# Patient Record
Sex: MALE | Race: WHITE | NOT HISPANIC OR LATINO | Employment: OTHER | URBAN - METROPOLITAN AREA
[De-identification: names, ages, dates, MRNs, and addresses within clinical notes are randomized per-mention and may not be internally consistent; named-entity substitution may affect disease eponyms.]

---

## 2017-06-30 ENCOUNTER — HISTORICAL (OUTPATIENT)
Dept: RADIOLOGY | Facility: HOSPITAL | Age: 67
End: 2017-06-30

## 2017-08-01 ENCOUNTER — HISTORICAL (OUTPATIENT)
Dept: RADIOLOGY | Facility: HOSPITAL | Age: 67
End: 2017-08-01

## 2017-09-01 ENCOUNTER — HISTORICAL (OUTPATIENT)
Dept: RADIOLOGY | Facility: HOSPITAL | Age: 67
End: 2017-09-01

## 2017-09-13 ENCOUNTER — HISTORICAL (OUTPATIENT)
Dept: RADIOLOGY | Facility: HOSPITAL | Age: 67
End: 2017-09-13

## 2018-08-28 ENCOUNTER — HISTORICAL (OUTPATIENT)
Dept: ADMINISTRATIVE | Facility: HOSPITAL | Age: 68
End: 2018-08-28

## 2018-08-28 LAB
HCO3 UR-SCNC: 20.1 MMOL/L (ref 22–26)
PCO2 BLDA: 34.2 MMHG (ref 35–45)
PH SMN: 7.38 [PH] (ref 7.35–7.45)
PO2 BLDA: 49 MMHG (ref 80–100)
PO2 BLDA: 49 MMHG (ref 80–100)
POC ALLENS TEST: POSITIVE
POC BE: -5 MMOL/L (ref -2–3)
POC SAMPLESOURCE: ABNORMAL
POC SATURATED O2: 84 % (ref 96–97)
POC SATURATED O2: 84 MMHG (ref 96–97)
POC SITE: ABNORMAL
POC TCO2: 21 MMOL/L (ref 24–29)
POC TREATMENT: ABNORMAL

## 2020-12-21 ENCOUNTER — HISTORICAL (OUTPATIENT)
Dept: INFECTIOUS DISEASES | Facility: HOSPITAL | Age: 70
End: 2020-12-21

## 2021-03-29 DIAGNOSIS — Q21.10 ASD (ATRIAL SEPTAL DEFECT): Primary | ICD-10-CM

## 2021-03-30 ENCOUNTER — OFFICE VISIT (OUTPATIENT)
Dept: CARDIOLOGY | Facility: CLINIC | Age: 71
End: 2021-03-30
Payer: MEDICARE

## 2021-03-30 ENCOUNTER — TELEPHONE (OUTPATIENT)
Dept: CARDIOLOGY | Facility: CLINIC | Age: 71
End: 2021-03-30

## 2021-03-30 VITALS
DIASTOLIC BLOOD PRESSURE: 86 MMHG | HEART RATE: 53 BPM | HEIGHT: 66 IN | WEIGHT: 134.06 LBS | OXYGEN SATURATION: 89 % | SYSTOLIC BLOOD PRESSURE: 164 MMHG | BODY MASS INDEX: 21.55 KG/M2

## 2021-03-30 DIAGNOSIS — D15.1 BENIGN NEOPLASM OF HEART: ICD-10-CM

## 2021-03-30 DIAGNOSIS — E78.5 HYPERLIPIDEMIA, UNSPECIFIED HYPERLIPIDEMIA TYPE: ICD-10-CM

## 2021-03-30 DIAGNOSIS — Q21.10 ASD (ATRIAL SEPTAL DEFECT): ICD-10-CM

## 2021-03-30 DIAGNOSIS — Z86.79 HISTORY OF PULMONARY VALVE STENOSIS: ICD-10-CM

## 2021-03-30 DIAGNOSIS — I10 HYPERTENSION, UNSPECIFIED TYPE: ICD-10-CM

## 2021-03-30 DIAGNOSIS — Q21.10 ASD (ATRIAL SEPTAL DEFECT): Primary | ICD-10-CM

## 2021-03-30 DIAGNOSIS — I37.1 NONRHEUMATIC PULMONARY VALVE INSUFFICIENCY: ICD-10-CM

## 2021-03-30 DIAGNOSIS — Q24.9 CONGENITAL MALFORMATION OF HEART, UNSPECIFIED: ICD-10-CM

## 2021-03-30 PROCEDURE — 99205 PR OFFICE/OUTPT VISIT, NEW, LEVL V, 60-74 MIN: ICD-10-PCS | Mod: S$PBB,,, | Performed by: INTERNAL MEDICINE

## 2021-03-30 PROCEDURE — 99999 PR PBB SHADOW E&M-EST. PATIENT-LVL III: CPT | Mod: PBBFAC,,, | Performed by: INTERNAL MEDICINE

## 2021-03-30 PROCEDURE — 99205 OFFICE O/P NEW HI 60 MIN: CPT | Mod: S$PBB,,, | Performed by: INTERNAL MEDICINE

## 2021-03-30 PROCEDURE — 99999 PR PBB SHADOW E&M-EST. PATIENT-LVL III: ICD-10-PCS | Mod: PBBFAC,,, | Performed by: INTERNAL MEDICINE

## 2021-03-30 PROCEDURE — 99213 OFFICE O/P EST LOW 20 MIN: CPT | Mod: PBBFAC | Performed by: INTERNAL MEDICINE

## 2021-03-30 RX ORDER — ASPIRIN 81 MG/1
81 TABLET ORAL DAILY
COMMUNITY

## 2021-03-30 RX ORDER — TRAZODONE HYDROCHLORIDE 50 MG/1
50 TABLET ORAL NIGHTLY
COMMUNITY

## 2021-03-30 RX ORDER — CARVEDILOL 3.12 MG/1
3.12 TABLET ORAL 2 TIMES DAILY WITH MEALS
COMMUNITY
End: 2021-05-26

## 2021-03-30 RX ORDER — ZOLPIDEM TARTRATE 10 MG/1
5 TABLET ORAL NIGHTLY PRN
COMMUNITY

## 2021-03-30 RX ORDER — OMEPRAZOLE 20 MG/1
20 CAPSULE, DELAYED RELEASE ORAL DAILY
COMMUNITY

## 2021-03-30 RX ORDER — PRAVASTATIN SODIUM 40 MG/1
40 TABLET ORAL DAILY
Status: ON HOLD | COMMUNITY
End: 2021-05-27 | Stop reason: HOSPADM

## 2021-03-30 RX ORDER — DIAZEPAM 10 MG/1
10 TABLET ORAL
COMMUNITY

## 2021-03-30 RX ORDER — LEVOTHYROXINE SODIUM 100 UG/1
150 TABLET ORAL
COMMUNITY

## 2021-03-30 RX ORDER — DIPHENHYDRAMINE HCL 50 MG
50 CAPSULE ORAL ONCE
Status: CANCELLED | OUTPATIENT
Start: 2021-03-30 | End: 2021-03-30

## 2021-03-30 RX ORDER — CETIRIZINE HYDROCHLORIDE 10 MG/1
10 TABLET ORAL DAILY
COMMUNITY

## 2021-03-30 RX ORDER — FLUTICASONE PROPIONATE 50 MCG
1 SPRAY, SUSPENSION (ML) NASAL
COMMUNITY

## 2021-03-30 RX ORDER — SODIUM CHLORIDE 9 MG/ML
INJECTION, SOLUTION INTRAVENOUS CONTINUOUS
Status: CANCELLED | OUTPATIENT
Start: 2021-03-30 | End: 2021-03-30

## 2021-05-26 ENCOUNTER — HOSPITAL ENCOUNTER (INPATIENT)
Facility: HOSPITAL | Age: 71
LOS: 1 days | Discharge: HOME | DRG: 065 | End: 2021-05-27
Attending: EMERGENCY MEDICINE | Admitting: PSYCHIATRY & NEUROLOGY
Payer: MEDICARE

## 2021-05-26 ENCOUNTER — TELEPHONE (OUTPATIENT)
Dept: CARDIOLOGY | Facility: CLINIC | Age: 71
End: 2021-05-26

## 2021-05-26 DIAGNOSIS — I63.9 CEREBROVASCULAR ACCIDENT (CVA), UNSPECIFIED MECHANISM: ICD-10-CM

## 2021-05-26 DIAGNOSIS — G93.6 CYTOTOXIC CEREBRAL EDEMA: ICD-10-CM

## 2021-05-26 DIAGNOSIS — I10 HYPERTENSION, UNSPECIFIED TYPE: ICD-10-CM

## 2021-05-26 DIAGNOSIS — I63.412 EMBOLIC STROKE INVOLVING LEFT MIDDLE CEREBRAL ARTERY: ICD-10-CM

## 2021-05-26 DIAGNOSIS — Q21.10 ASD (ATRIAL SEPTAL DEFECT): ICD-10-CM

## 2021-05-26 DIAGNOSIS — I65.23 BILATERAL CAROTID ARTERY STENOSIS: ICD-10-CM

## 2021-05-26 DIAGNOSIS — I63.9 STROKE: ICD-10-CM

## 2021-05-26 PROBLEM — I63.233 CEREBROVASCULAR ACCIDENT (CVA) DUE TO BILATERAL STENOSIS OF CAROTID ARTERIES: Status: ACTIVE | Noted: 2021-05-26

## 2021-05-26 LAB
ALBUMIN SERPL BCP-MCNC: 4.1 G/DL (ref 3.5–5.2)
ALP SERPL-CCNC: 121 U/L (ref 55–135)
ALT SERPL W/O P-5'-P-CCNC: 17 U/L (ref 10–44)
ANION GAP SERPL CALC-SCNC: 11 MMOL/L (ref 8–16)
AST SERPL-CCNC: 28 U/L (ref 10–40)
BASOPHILS # BLD AUTO: 0.05 K/UL (ref 0–0.2)
BASOPHILS NFR BLD: 0.6 % (ref 0–1.9)
BILIRUB SERPL-MCNC: 0.6 MG/DL (ref 0.1–1)
BUN SERPL-MCNC: 9 MG/DL (ref 8–23)
CALCIUM SERPL-MCNC: 9.3 MG/DL (ref 8.7–10.5)
CHLORIDE SERPL-SCNC: 103 MMOL/L (ref 95–110)
CHOLEST SERPL-MCNC: 221 MG/DL (ref 120–199)
CHOLEST/HDLC SERPL: 2.9 {RATIO} (ref 2–5)
CO2 SERPL-SCNC: 21 MMOL/L (ref 23–29)
CREAT SERPL-MCNC: 1.1 MG/DL (ref 0.5–1.4)
CTP QC/QA: YES
DIFFERENTIAL METHOD: ABNORMAL
EOSINOPHIL # BLD AUTO: 0.1 K/UL (ref 0–0.5)
EOSINOPHIL NFR BLD: 1.3 % (ref 0–8)
ERYTHROCYTE [DISTWIDTH] IN BLOOD BY AUTOMATED COUNT: 13.6 % (ref 11.5–14.5)
EST. GFR  (AFRICAN AMERICAN): >60 ML/MIN/1.73 M^2
EST. GFR  (NON AFRICAN AMERICAN): >60 ML/MIN/1.73 M^2
ESTIMATED AVG GLUCOSE: 131 MG/DL (ref 68–131)
GLUCOSE SERPL-MCNC: 92 MG/DL (ref 70–110)
HBA1C MFR BLD: 6.2 % (ref 4–5.6)
HCT VFR BLD AUTO: 55.4 % (ref 40–54)
HDLC SERPL-MCNC: 77 MG/DL (ref 40–75)
HDLC SERPL: 34.8 % (ref 20–50)
HGB BLD-MCNC: 18.2 G/DL (ref 14–18)
IMM GRANULOCYTES # BLD AUTO: 0.03 K/UL (ref 0–0.04)
IMM GRANULOCYTES NFR BLD AUTO: 0.3 % (ref 0–0.5)
LDLC SERPL CALC-MCNC: 125.8 MG/DL (ref 63–159)
LYMPHOCYTES # BLD AUTO: 1.6 K/UL (ref 1–4.8)
LYMPHOCYTES NFR BLD: 18.2 % (ref 18–48)
MCH RBC QN AUTO: 31.4 PG (ref 27–31)
MCHC RBC AUTO-ENTMCNC: 32.9 G/DL (ref 32–36)
MCV RBC AUTO: 96 FL (ref 82–98)
MONOCYTES # BLD AUTO: 0.7 K/UL (ref 0.3–1)
MONOCYTES NFR BLD: 7.8 % (ref 4–15)
NEUTROPHILS # BLD AUTO: 6.4 K/UL (ref 1.8–7.7)
NEUTROPHILS NFR BLD: 71.8 % (ref 38–73)
NONHDLC SERPL-MCNC: 144 MG/DL
NRBC BLD-RTO: 0 /100 WBC
PLATELET # BLD AUTO: 146 K/UL (ref 150–450)
PMV BLD AUTO: 12.6 FL (ref 9.2–12.9)
POTASSIUM SERPL-SCNC: 5.6 MMOL/L (ref 3.5–5.1)
PROT SERPL-MCNC: 7.9 G/DL (ref 6–8.4)
RBC # BLD AUTO: 5.79 M/UL (ref 4.6–6.2)
SARS-COV-2 RDRP RESP QL NAA+PROBE: NEGATIVE
SODIUM SERPL-SCNC: 135 MMOL/L (ref 136–145)
TRIGL SERPL-MCNC: 91 MG/DL (ref 30–150)
TSH SERPL DL<=0.005 MIU/L-ACNC: 1.09 UIU/ML (ref 0.4–4)
WBC # BLD AUTO: 8.97 K/UL (ref 3.9–12.7)

## 2021-05-26 PROCEDURE — 80053 COMPREHEN METABOLIC PANEL: CPT | Performed by: PHYSICIAN ASSISTANT

## 2021-05-26 PROCEDURE — 80061 LIPID PANEL: CPT | Performed by: PHYSICIAN ASSISTANT

## 2021-05-26 PROCEDURE — 93010 EKG 12-LEAD: ICD-10-PCS | Mod: ,,, | Performed by: INTERNAL MEDICINE

## 2021-05-26 PROCEDURE — 11000001 HC ACUTE MED/SURG PRIVATE ROOM

## 2021-05-26 PROCEDURE — 99285 EMERGENCY DEPT VISIT HI MDM: CPT | Mod: CS,,, | Performed by: EMERGENCY MEDICINE

## 2021-05-26 PROCEDURE — 25000003 PHARM REV CODE 250: Performed by: PHYSICIAN ASSISTANT

## 2021-05-26 PROCEDURE — U0002 COVID-19 LAB TEST NON-CDC: HCPCS | Performed by: PHYSICIAN ASSISTANT

## 2021-05-26 PROCEDURE — 99223 PR INITIAL HOSPITAL CARE,LEVL III: ICD-10-PCS | Mod: AI,,, | Performed by: PSYCHIATRY & NEUROLOGY

## 2021-05-26 PROCEDURE — 99285 PR EMERGENCY DEPT VISIT,LEVEL V: ICD-10-PCS | Mod: CS,,, | Performed by: EMERGENCY MEDICINE

## 2021-05-26 PROCEDURE — 84443 ASSAY THYROID STIM HORMONE: CPT | Performed by: PHYSICIAN ASSISTANT

## 2021-05-26 PROCEDURE — 25500020 PHARM REV CODE 255: Performed by: EMERGENCY MEDICINE

## 2021-05-26 PROCEDURE — 83036 HEMOGLOBIN GLYCOSYLATED A1C: CPT | Performed by: PHYSICIAN ASSISTANT

## 2021-05-26 PROCEDURE — 93005 ELECTROCARDIOGRAM TRACING: CPT

## 2021-05-26 PROCEDURE — 85025 COMPLETE CBC W/AUTO DIFF WBC: CPT | Performed by: PHYSICIAN ASSISTANT

## 2021-05-26 PROCEDURE — 93010 ELECTROCARDIOGRAM REPORT: CPT | Mod: ,,, | Performed by: INTERNAL MEDICINE

## 2021-05-26 PROCEDURE — 99223 1ST HOSP IP/OBS HIGH 75: CPT | Mod: AI,,, | Performed by: PSYCHIATRY & NEUROLOGY

## 2021-05-26 PROCEDURE — 99285 EMERGENCY DEPT VISIT HI MDM: CPT | Mod: 25

## 2021-05-26 PROCEDURE — 63600175 PHARM REV CODE 636 W HCPCS: Performed by: PHYSICIAN ASSISTANT

## 2021-05-26 RX ORDER — FLUTICASONE PROPIONATE 50 MCG
1 SPRAY, SUSPENSION (ML) NASAL
Status: DISCONTINUED | OUTPATIENT
Start: 2021-05-26 | End: 2021-05-27 | Stop reason: HOSPADM

## 2021-05-26 RX ORDER — PHENOBARBITAL 100 MG/1
100 TABLET ORAL NIGHTLY
Status: DISCONTINUED | OUTPATIENT
Start: 2021-05-26 | End: 2021-05-27 | Stop reason: HOSPADM

## 2021-05-26 RX ORDER — SODIUM CHLORIDE 0.9 % (FLUSH) 0.9 %
10 SYRINGE (ML) INJECTION
Status: DISCONTINUED | OUTPATIENT
Start: 2021-05-26 | End: 2021-05-27 | Stop reason: HOSPADM

## 2021-05-26 RX ORDER — PRAVASTATIN SODIUM 10 MG/1
40 TABLET ORAL DAILY
Status: DISCONTINUED | OUTPATIENT
Start: 2021-05-27 | End: 2021-05-26

## 2021-05-26 RX ORDER — SUCRALFATE 1 G/10ML
1 SUSPENSION ORAL EVERY 6 HOURS
Status: DISCONTINUED | OUTPATIENT
Start: 2021-05-27 | End: 2021-05-27 | Stop reason: HOSPADM

## 2021-05-26 RX ORDER — DIAZEPAM 5 MG/1
10 TABLET ORAL DAILY PRN
Status: DISCONTINUED | OUTPATIENT
Start: 2021-05-26 | End: 2021-05-27 | Stop reason: HOSPADM

## 2021-05-26 RX ORDER — ACETAMINOPHEN 325 MG/1
650 TABLET ORAL EVERY 6 HOURS PRN
Status: DISCONTINUED | OUTPATIENT
Start: 2021-05-26 | End: 2021-05-27 | Stop reason: HOSPADM

## 2021-05-26 RX ORDER — TALC
6 POWDER (GRAM) TOPICAL NIGHTLY PRN
Status: DISCONTINUED | OUTPATIENT
Start: 2021-05-26 | End: 2021-05-27 | Stop reason: HOSPADM

## 2021-05-26 RX ORDER — ONDANSETRON 2 MG/ML
4 INJECTION INTRAMUSCULAR; INTRAVENOUS EVERY 12 HOURS PRN
Status: DISCONTINUED | OUTPATIENT
Start: 2021-05-26 | End: 2021-05-27 | Stop reason: HOSPADM

## 2021-05-26 RX ORDER — PANTOPRAZOLE SODIUM 40 MG/1
40 TABLET, DELAYED RELEASE ORAL DAILY
Status: DISCONTINUED | OUTPATIENT
Start: 2021-05-27 | End: 2021-05-27 | Stop reason: HOSPADM

## 2021-05-26 RX ORDER — ATORVASTATIN CALCIUM 20 MG/1
40 TABLET, FILM COATED ORAL DAILY
Status: DISCONTINUED | OUTPATIENT
Start: 2021-05-27 | End: 2021-05-27 | Stop reason: HOSPADM

## 2021-05-26 RX ORDER — POLYETHYLENE GLYCOL 3350 17 G/17G
17 POWDER, FOR SOLUTION ORAL DAILY PRN
Status: DISCONTINUED | OUTPATIENT
Start: 2021-05-26 | End: 2021-05-27 | Stop reason: HOSPADM

## 2021-05-26 RX ORDER — CLOPIDOGREL BISULFATE 75 MG/1
75 TABLET ORAL DAILY
Status: DISCONTINUED | OUTPATIENT
Start: 2021-05-27 | End: 2021-05-27 | Stop reason: HOSPADM

## 2021-05-26 RX ORDER — CETIRIZINE HYDROCHLORIDE 5 MG/1
10 TABLET ORAL DAILY
Status: DISCONTINUED | OUTPATIENT
Start: 2021-05-27 | End: 2021-05-27 | Stop reason: HOSPADM

## 2021-05-26 RX ORDER — ASPIRIN 81 MG/1
81 TABLET ORAL DAILY
Status: DISCONTINUED | OUTPATIENT
Start: 2021-05-27 | End: 2021-05-27 | Stop reason: HOSPADM

## 2021-05-26 RX ORDER — MAG HYDROX/ALUMINUM HYD/SIMETH 200-200-20
30 SUSPENSION, ORAL (FINAL DOSE FORM) ORAL
Status: DISCONTINUED | OUTPATIENT
Start: 2021-05-26 | End: 2021-05-27 | Stop reason: HOSPADM

## 2021-05-26 RX ORDER — LABETALOL HCL 20 MG/4 ML
10 SYRINGE (ML) INTRAVENOUS
Status: DISCONTINUED | OUTPATIENT
Start: 2021-05-26 | End: 2021-05-27 | Stop reason: HOSPADM

## 2021-05-26 RX ORDER — HEPARIN SODIUM 5000 [USP'U]/ML
5000 INJECTION, SOLUTION INTRAVENOUS; SUBCUTANEOUS EVERY 8 HOURS
Status: DISCONTINUED | OUTPATIENT
Start: 2021-05-26 | End: 2021-05-27 | Stop reason: HOSPADM

## 2021-05-26 RX ADMIN — PHENOBARBITAL 100 MG: 100 TABLET ORAL at 10:05

## 2021-05-26 RX ADMIN — IOHEXOL 100 ML: 350 INJECTION, SOLUTION INTRAVENOUS at 04:05

## 2021-05-26 RX ADMIN — HEPARIN SODIUM 5000 UNITS: 5000 INJECTION INTRAVENOUS; SUBCUTANEOUS at 10:05

## 2021-05-26 RX ADMIN — MELATONIN TAB 3 MG 6 MG: 3 TAB at 11:05

## 2021-05-27 VITALS
OXYGEN SATURATION: 100 % | WEIGHT: 132.94 LBS | DIASTOLIC BLOOD PRESSURE: 74 MMHG | SYSTOLIC BLOOD PRESSURE: 124 MMHG | HEIGHT: 66 IN | BODY MASS INDEX: 21.36 KG/M2 | TEMPERATURE: 97 F | RESPIRATION RATE: 18 BRPM | HEART RATE: 77 BPM

## 2021-05-27 LAB
ALBUMIN SERPL BCP-MCNC: 3.5 G/DL (ref 3.5–5.2)
ALP SERPL-CCNC: 111 U/L (ref 55–135)
ALT SERPL W/O P-5'-P-CCNC: 13 U/L (ref 10–44)
ANION GAP SERPL CALC-SCNC: 13 MMOL/L (ref 8–16)
APTT BLDCRRT: 30.6 SEC (ref 21–32)
AST SERPL-CCNC: 16 U/L (ref 10–40)
BASOPHILS # BLD AUTO: 0.06 K/UL (ref 0–0.2)
BASOPHILS NFR BLD: 0.8 % (ref 0–1.9)
BILIRUB SERPL-MCNC: 0.5 MG/DL (ref 0.1–1)
BUN SERPL-MCNC: 13 MG/DL (ref 8–23)
CALCIUM SERPL-MCNC: 8.9 MG/DL (ref 8.7–10.5)
CHLORIDE SERPL-SCNC: 107 MMOL/L (ref 95–110)
CK MB SERPL-MCNC: 1.3 NG/ML (ref 0.1–6.5)
CK MB SERPL-RTO: 2.1 % (ref 0–5)
CK SERPL-CCNC: 63 U/L (ref 20–200)
CO2 SERPL-SCNC: 20 MMOL/L (ref 23–29)
CREAT SERPL-MCNC: 0.9 MG/DL (ref 0.5–1.4)
DIFFERENTIAL METHOD: ABNORMAL
EOSINOPHIL # BLD AUTO: 0.1 K/UL (ref 0–0.5)
EOSINOPHIL NFR BLD: 1.7 % (ref 0–8)
ERYTHROCYTE [DISTWIDTH] IN BLOOD BY AUTOMATED COUNT: 13.4 % (ref 11.5–14.5)
EST. GFR  (AFRICAN AMERICAN): >60 ML/MIN/1.73 M^2
EST. GFR  (NON AFRICAN AMERICAN): >60 ML/MIN/1.73 M^2
GLUCOSE SERPL-MCNC: 79 MG/DL (ref 70–110)
HCT VFR BLD AUTO: 51.2 % (ref 40–54)
HGB BLD-MCNC: 16.7 G/DL (ref 14–18)
IMM GRANULOCYTES # BLD AUTO: 0.01 K/UL (ref 0–0.04)
IMM GRANULOCYTES NFR BLD AUTO: 0.1 % (ref 0–0.5)
INR PPP: 0.8 (ref 0.8–1.2)
LYMPHOCYTES # BLD AUTO: 1.9 K/UL (ref 1–4.8)
LYMPHOCYTES NFR BLD: 27.1 % (ref 18–48)
MAGNESIUM SERPL-MCNC: 1.8 MG/DL (ref 1.6–2.6)
MCH RBC QN AUTO: 31.1 PG (ref 27–31)
MCHC RBC AUTO-ENTMCNC: 32.6 G/DL (ref 32–36)
MCV RBC AUTO: 95 FL (ref 82–98)
MONOCYTES # BLD AUTO: 0.6 K/UL (ref 0.3–1)
MONOCYTES NFR BLD: 8.9 % (ref 4–15)
NEUTROPHILS # BLD AUTO: 4.4 K/UL (ref 1.8–7.7)
NEUTROPHILS NFR BLD: 61.4 % (ref 38–73)
NRBC BLD-RTO: 0 /100 WBC
PHOSPHATE SERPL-MCNC: 3.6 MG/DL (ref 2.7–4.5)
PLATELET # BLD AUTO: 145 K/UL (ref 150–450)
PMV BLD AUTO: 13 FL (ref 9.2–12.9)
POTASSIUM SERPL-SCNC: 4 MMOL/L (ref 3.5–5.1)
PROT SERPL-MCNC: 6.4 G/DL (ref 6–8.4)
PROTHROMBIN TIME: <9 SEC (ref 9–12.5)
RBC # BLD AUTO: 5.37 M/UL (ref 4.6–6.2)
SODIUM SERPL-SCNC: 140 MMOL/L (ref 136–145)
TROPONIN I SERPL DL<=0.01 NG/ML-MCNC: <0.006 NG/ML (ref 0–0.03)
WBC # BLD AUTO: 7.17 K/UL (ref 3.9–12.7)

## 2021-05-27 PROCEDURE — 92610 EVALUATE SWALLOWING FUNCTION: CPT

## 2021-05-27 PROCEDURE — 63600175 PHARM REV CODE 636 W HCPCS: Performed by: PHYSICIAN ASSISTANT

## 2021-05-27 PROCEDURE — 93880 EXTRACRANIAL BILAT STUDY: CPT | Performed by: SURGERY

## 2021-05-27 PROCEDURE — 85025 COMPLETE CBC W/AUTO DIFF WBC: CPT | Performed by: PHYSICIAN ASSISTANT

## 2021-05-27 PROCEDURE — 99222 PR INITIAL HOSPITAL CARE,LEVL II: ICD-10-PCS | Mod: ,,, | Performed by: NURSE PRACTITIONER

## 2021-05-27 PROCEDURE — 36415 COLL VENOUS BLD VENIPUNCTURE: CPT | Performed by: PHYSICIAN ASSISTANT

## 2021-05-27 PROCEDURE — 84100 ASSAY OF PHOSPHORUS: CPT | Performed by: PHYSICIAN ASSISTANT

## 2021-05-27 PROCEDURE — 85610 PROTHROMBIN TIME: CPT | Performed by: PHYSICIAN ASSISTANT

## 2021-05-27 PROCEDURE — 99222 1ST HOSP IP/OBS MODERATE 55: CPT | Mod: ,,, | Performed by: NURSE PRACTITIONER

## 2021-05-27 PROCEDURE — 99238 HOSP IP/OBS DSCHRG MGMT 30/<: CPT | Mod: GC,,, | Performed by: PSYCHIATRY & NEUROLOGY

## 2021-05-27 PROCEDURE — 97161 PT EVAL LOW COMPLEX 20 MIN: CPT

## 2021-05-27 PROCEDURE — 92523 SPEECH SOUND LANG COMPREHEN: CPT

## 2021-05-27 PROCEDURE — 99238 PR HOSPITAL DISCHARGE DAY,<30 MIN: ICD-10-PCS | Mod: GC,,, | Performed by: PSYCHIATRY & NEUROLOGY

## 2021-05-27 PROCEDURE — 84484 ASSAY OF TROPONIN QUANT: CPT | Performed by: PHYSICIAN ASSISTANT

## 2021-05-27 PROCEDURE — 80053 COMPREHEN METABOLIC PANEL: CPT | Performed by: PHYSICIAN ASSISTANT

## 2021-05-27 PROCEDURE — 25000003 PHARM REV CODE 250: Performed by: PHYSICIAN ASSISTANT

## 2021-05-27 PROCEDURE — 82550 ASSAY OF CK (CPK): CPT | Performed by: PHYSICIAN ASSISTANT

## 2021-05-27 PROCEDURE — 97165 OT EVAL LOW COMPLEX 30 MIN: CPT

## 2021-05-27 PROCEDURE — 83735 ASSAY OF MAGNESIUM: CPT | Performed by: PHYSICIAN ASSISTANT

## 2021-05-27 PROCEDURE — 85730 THROMBOPLASTIN TIME PARTIAL: CPT | Performed by: PHYSICIAN ASSISTANT

## 2021-05-27 PROCEDURE — 97535 SELF CARE MNGMENT TRAINING: CPT

## 2021-05-27 RX ORDER — ATORVASTATIN CALCIUM 40 MG/1
40 TABLET, FILM COATED ORAL DAILY
Qty: 30 TABLET | Refills: 0 | Status: SHIPPED | OUTPATIENT
Start: 2021-05-28 | End: 2022-04-19

## 2021-05-27 RX ORDER — CLOPIDOGREL BISULFATE 75 MG/1
75 TABLET ORAL DAILY
Qty: 21 TABLET | Refills: 0 | Status: ON HOLD | OUTPATIENT
Start: 2021-05-28 | End: 2021-10-08 | Stop reason: SDUPTHER

## 2021-05-27 RX ADMIN — ALUMINUM HYDROXIDE, MAGNESIUM HYDROXIDE, AND SIMETHICONE 30 ML: 200; 200; 20 SUSPENSION ORAL at 05:05

## 2021-05-27 RX ADMIN — ONDANSETRON 4 MG: 2 INJECTION INTRAMUSCULAR; INTRAVENOUS at 02:05

## 2021-05-27 RX ADMIN — HEPARIN SODIUM 5000 UNITS: 5000 INJECTION INTRAVENOUS; SUBCUTANEOUS at 05:05

## 2021-05-27 RX ADMIN — CETIRIZINE HYDROCHLORIDE 10 MG: 5 TABLET ORAL at 08:05

## 2021-05-27 RX ADMIN — ASPIRIN 81 MG: 81 TABLET, COATED ORAL at 08:05

## 2021-05-27 RX ADMIN — HEPARIN SODIUM 5000 UNITS: 5000 INJECTION INTRAVENOUS; SUBCUTANEOUS at 02:05

## 2021-05-27 RX ADMIN — CLOPIDOGREL 75 MG: 75 TABLET, FILM COATED ORAL at 08:05

## 2021-05-27 RX ADMIN — PANTOPRAZOLE SODIUM 40 MG: 40 TABLET, DELAYED RELEASE ORAL at 08:05

## 2021-05-27 RX ADMIN — ATORVASTATIN CALCIUM 40 MG: 20 TABLET, FILM COATED ORAL at 08:05

## 2021-05-27 RX ADMIN — LEVOTHYROXINE SODIUM 150 MCG: 25 TABLET ORAL at 05:05

## 2021-05-31 ENCOUNTER — DOCUMENTATION ONLY (OUTPATIENT)
Dept: CARDIOLOGY | Facility: CLINIC | Age: 71
End: 2021-05-31

## 2021-06-01 ENCOUNTER — TELEPHONE (OUTPATIENT)
Dept: NEUROLOGY | Facility: CLINIC | Age: 71
End: 2021-06-01

## 2021-07-15 ENCOUNTER — DOCUMENTATION ONLY (OUTPATIENT)
Dept: CARDIOLOGY | Facility: CLINIC | Age: 71
End: 2021-07-15

## 2021-07-16 DIAGNOSIS — Q21.10 ASD (ATRIAL SEPTAL DEFECT): Primary | ICD-10-CM

## 2021-09-01 ENCOUNTER — PATIENT MESSAGE (OUTPATIENT)
Dept: CARDIOLOGY | Facility: CLINIC | Age: 71
End: 2021-09-01

## 2021-09-15 ENCOUNTER — PATIENT MESSAGE (OUTPATIENT)
Dept: CARDIOLOGY | Facility: CLINIC | Age: 71
End: 2021-09-15

## 2021-09-16 ENCOUNTER — DOCUMENTATION ONLY (OUTPATIENT)
Dept: CARDIOLOGY | Facility: CLINIC | Age: 71
End: 2021-09-16

## 2021-09-16 DIAGNOSIS — Q21.10 ASD (ATRIAL SEPTAL DEFECT): Primary | ICD-10-CM

## 2021-09-28 ENCOUNTER — HOSPITAL ENCOUNTER (OUTPATIENT)
Facility: HOSPITAL | Age: 71
Discharge: HOME | End: 2021-09-28
Attending: INTERNAL MEDICINE | Admitting: INTERNAL MEDICINE
Payer: MEDICARE

## 2021-09-28 ENCOUNTER — HOSPITAL ENCOUNTER (OUTPATIENT)
Dept: CARDIOLOGY | Facility: HOSPITAL | Age: 71
Discharge: HOME | End: 2021-09-28
Attending: INTERNAL MEDICINE | Admitting: INTERNAL MEDICINE
Payer: MEDICARE

## 2021-09-28 ENCOUNTER — ANESTHESIA EVENT (OUTPATIENT)
Dept: MEDSURG UNIT | Facility: HOSPITAL | Age: 71
End: 2021-09-28
Payer: MEDICARE

## 2021-09-28 ENCOUNTER — DOCUMENTATION ONLY (OUTPATIENT)
Dept: CARDIOLOGY | Facility: CLINIC | Age: 71
End: 2021-09-28

## 2021-09-28 ENCOUNTER — OFFICE VISIT (OUTPATIENT)
Dept: CARDIOLOGY | Facility: CLINIC | Age: 71
End: 2021-09-28
Payer: MEDICARE

## 2021-09-28 ENCOUNTER — ANESTHESIA (OUTPATIENT)
Dept: MEDSURG UNIT | Facility: HOSPITAL | Age: 71
End: 2021-09-28
Payer: MEDICARE

## 2021-09-28 VITALS
HEIGHT: 66 IN | BODY MASS INDEX: 21.69 KG/M2 | WEIGHT: 135 LBS | SYSTOLIC BLOOD PRESSURE: 182 MMHG | DIASTOLIC BLOOD PRESSURE: 87 MMHG

## 2021-09-28 VITALS
HEIGHT: 66 IN | SYSTOLIC BLOOD PRESSURE: 168 MMHG | WEIGHT: 135 LBS | OXYGEN SATURATION: 93 % | HEART RATE: 62 BPM | DIASTOLIC BLOOD PRESSURE: 82 MMHG | BODY MASS INDEX: 21.69 KG/M2 | RESPIRATION RATE: 18 BRPM | TEMPERATURE: 97 F

## 2021-09-28 VITALS
HEIGHT: 66 IN | WEIGHT: 133.63 LBS | SYSTOLIC BLOOD PRESSURE: 160 MMHG | OXYGEN SATURATION: 87 % | BODY MASS INDEX: 21.47 KG/M2 | HEART RATE: 63 BPM | DIASTOLIC BLOOD PRESSURE: 78 MMHG

## 2021-09-28 DIAGNOSIS — Q21.10 ASD (ATRIAL SEPTAL DEFECT): ICD-10-CM

## 2021-09-28 DIAGNOSIS — I63.411 CEREBROVASCULAR ACCIDENT (CVA) DUE TO EMBOLISM OF RIGHT MIDDLE CEREBRAL ARTERY: ICD-10-CM

## 2021-09-28 DIAGNOSIS — J96.11 CHRONIC RESPIRATORY FAILURE WITH HYPOXIA: Chronic | ICD-10-CM

## 2021-09-28 DIAGNOSIS — Z01.818 PRE-OP TESTING: ICD-10-CM

## 2021-09-28 DIAGNOSIS — Q21.10 ASD (ATRIAL SEPTAL DEFECT): Primary | ICD-10-CM

## 2021-09-28 DIAGNOSIS — I10 HYPERTENSION, UNSPECIFIED TYPE: ICD-10-CM

## 2021-09-28 DIAGNOSIS — I65.23 BILATERAL CAROTID ARTERY STENOSIS: ICD-10-CM

## 2021-09-28 DIAGNOSIS — E78.5 HYPERLIPIDEMIA, UNSPECIFIED HYPERLIPIDEMIA TYPE: ICD-10-CM

## 2021-09-28 DIAGNOSIS — Z87.891 FORMER SMOKER: Chronic | ICD-10-CM

## 2021-09-28 DIAGNOSIS — R79.81 ABNORMAL BLOOD-GAS LEVEL: ICD-10-CM

## 2021-09-28 DIAGNOSIS — Z86.79 HISTORY OF PULMONARY VALVE STENOSIS: ICD-10-CM

## 2021-09-28 LAB
BSA FOR ECHO PROCEDURE: 1.69 M2
EJECTION FRACTION: 60 %
PROX AORTA: 3 CM
RIGHT VENTRICULAR END-DIASTOLIC DIMENSION: 3.7 CM
SARS-COV-2 RDRP RESP QL NAA+PROBE: NEGATIVE
SINUS: 3 CM
STJ: 2.8 CM

## 2021-09-28 PROCEDURE — 93320 DOPPLER ECHO COMPLETE: CPT | Mod: 26,,, | Performed by: INTERNAL MEDICINE

## 2021-09-28 PROCEDURE — 93320 TRANSESOPHAGEAL ECHO (TEE) (CUPID ONLY): ICD-10-PCS | Mod: 26,,, | Performed by: INTERNAL MEDICINE

## 2021-09-28 PROCEDURE — 25000003 PHARM REV CODE 250: Performed by: NURSE ANESTHETIST, CERTIFIED REGISTERED

## 2021-09-28 PROCEDURE — 99214 OFFICE O/P EST MOD 30 MIN: CPT | Mod: PBBFAC,25 | Performed by: INTERNAL MEDICINE

## 2021-09-28 PROCEDURE — U0002 COVID-19 LAB TEST NON-CDC: HCPCS | Performed by: INTERNAL MEDICINE

## 2021-09-28 PROCEDURE — D9220A PRA ANESTHESIA: Mod: ANES,,, | Performed by: ANESTHESIOLOGY

## 2021-09-28 PROCEDURE — 93320 DOPPLER ECHO COMPLETE: CPT

## 2021-09-28 PROCEDURE — 93317 TRANSESOPHAGEAL ECHO (TEE) (CUPID ONLY): ICD-10-PCS | Mod: 26,,, | Performed by: INTERNAL MEDICINE

## 2021-09-28 PROCEDURE — 99215 OFFICE O/P EST HI 40 MIN: CPT | Mod: S$PBB,,, | Performed by: INTERNAL MEDICINE

## 2021-09-28 PROCEDURE — 63600175 PHARM REV CODE 636 W HCPCS: Performed by: NURSE ANESTHETIST, CERTIFIED REGISTERED

## 2021-09-28 PROCEDURE — 99215 PR OFFICE/OUTPT VISIT, EST, LEVL V, 40-54 MIN: ICD-10-PCS | Mod: S$PBB,,, | Performed by: INTERNAL MEDICINE

## 2021-09-28 PROCEDURE — D9220A PRA ANESTHESIA: ICD-10-PCS | Mod: CRNA,,, | Performed by: NURSE ANESTHETIST, CERTIFIED REGISTERED

## 2021-09-28 PROCEDURE — 99999 PR PBB SHADOW E&M-EST. PATIENT-LVL IV: CPT | Mod: PBBFAC,,, | Performed by: INTERNAL MEDICINE

## 2021-09-28 PROCEDURE — 93317 ECHO TRANSESOPHAGEAL: CPT | Mod: 26,,, | Performed by: INTERNAL MEDICINE

## 2021-09-28 PROCEDURE — 93325 TRANSESOPHAGEAL ECHO (TEE) (CUPID ONLY): ICD-10-PCS | Mod: 26,,, | Performed by: INTERNAL MEDICINE

## 2021-09-28 PROCEDURE — D9220A PRA ANESTHESIA: ICD-10-PCS | Mod: ANES,,, | Performed by: ANESTHESIOLOGY

## 2021-09-28 PROCEDURE — 37000009 HC ANESTHESIA EA ADD 15 MINS

## 2021-09-28 PROCEDURE — 37000008 HC ANESTHESIA 1ST 15 MINUTES

## 2021-09-28 PROCEDURE — 93325 DOPPLER ECHO COLOR FLOW MAPG: CPT | Mod: 26,,, | Performed by: INTERNAL MEDICINE

## 2021-09-28 PROCEDURE — D9220A PRA ANESTHESIA: Mod: CRNA,,, | Performed by: NURSE ANESTHETIST, CERTIFIED REGISTERED

## 2021-09-28 PROCEDURE — 99999 PR PBB SHADOW E&M-EST. PATIENT-LVL IV: ICD-10-PCS | Mod: PBBFAC,,, | Performed by: INTERNAL MEDICINE

## 2021-09-28 RX ORDER — SODIUM CHLORIDE 0.9 % (FLUSH) 0.9 %
10 SYRINGE (ML) INJECTION
Status: CANCELLED | OUTPATIENT
Start: 2021-09-28

## 2021-09-28 RX ORDER — LISINOPRIL 10 MG/1
10 TABLET ORAL 2 TIMES DAILY
COMMUNITY

## 2021-09-28 RX ORDER — LIDOCAINE HYDROCHLORIDE 20 MG/ML
INJECTION INTRAVENOUS
Status: DISCONTINUED | OUTPATIENT
Start: 2021-09-28 | End: 2021-09-28

## 2021-09-28 RX ORDER — PROPOFOL 10 MG/ML
VIAL (ML) INTRAVENOUS CONTINUOUS PRN
Status: DISCONTINUED | OUTPATIENT
Start: 2021-09-28 | End: 2021-09-28

## 2021-09-28 RX ORDER — DIPHENHYDRAMINE HCL 50 MG
50 CAPSULE ORAL ONCE
Status: CANCELLED | OUTPATIENT
Start: 2021-09-28 | End: 2021-09-28

## 2021-09-28 RX ORDER — PROPOFOL 10 MG/ML
VIAL (ML) INTRAVENOUS
Status: DISCONTINUED | OUTPATIENT
Start: 2021-09-28 | End: 2021-09-28

## 2021-09-28 RX ORDER — SODIUM CHLORIDE 9 MG/ML
INJECTION, SOLUTION INTRAVENOUS CONTINUOUS
Status: CANCELLED | OUTPATIENT
Start: 2021-09-28 | End: 2021-09-28

## 2021-09-28 RX ADMIN — Medication 150 MCG/KG/MIN: at 01:09

## 2021-09-28 RX ADMIN — PROPOFOL 50 MG: 10 INJECTION, EMULSION INTRAVENOUS at 01:09

## 2021-09-28 RX ADMIN — LIDOCAINE HYDROCHLORIDE 100 MG: 20 INJECTION, SOLUTION INTRAVENOUS at 01:09

## 2021-10-08 ENCOUNTER — HOSPITAL ENCOUNTER (INPATIENT)
Facility: HOSPITAL | Age: 71
LOS: 1 days | Discharge: HOME | DRG: 274 | End: 2021-10-08
Attending: INTERNAL MEDICINE | Admitting: INTERNAL MEDICINE
Payer: MEDICARE

## 2021-10-08 VITALS
OXYGEN SATURATION: 94 % | HEIGHT: 66 IN | TEMPERATURE: 99 F | HEART RATE: 55 BPM | WEIGHT: 135 LBS | SYSTOLIC BLOOD PRESSURE: 116 MMHG | DIASTOLIC BLOOD PRESSURE: 64 MMHG | BODY MASS INDEX: 21.69 KG/M2 | RESPIRATION RATE: 16 BRPM

## 2021-10-08 DIAGNOSIS — Q21.10 ASD (ATRIAL SEPTAL DEFECT): Primary | ICD-10-CM

## 2021-10-08 LAB
ABO + RH BLD: NORMAL
BLD GP AB SCN CELLS X3 SERPL QL: NORMAL

## 2021-10-08 PROCEDURE — 93662 INTRACARDIAC ECG (ICE): CPT | Mod: GC | Performed by: INTERNAL MEDICINE

## 2021-10-08 PROCEDURE — 93662 INTRACARDIAC ECG (ICE): CPT | Mod: 26,GC,, | Performed by: INTERNAL MEDICINE

## 2021-10-08 PROCEDURE — 99152 PR MOD CONSCIOUS SEDATION, SAME PHYS, 5+ YRS, FIRST 15 MIN: ICD-10-PCS | Mod: GC,,, | Performed by: INTERNAL MEDICINE

## 2021-10-08 PROCEDURE — C1817 SEPTAL DEFECT IMP SYS: HCPCS | Performed by: INTERNAL MEDICINE

## 2021-10-08 PROCEDURE — 99152 MOD SED SAME PHYS/QHP 5/>YRS: CPT | Mod: GC,,, | Performed by: INTERNAL MEDICINE

## 2021-10-08 PROCEDURE — 25000003 PHARM REV CODE 250: Performed by: STUDENT IN AN ORGANIZED HEALTH CARE EDUCATION/TRAINING PROGRAM

## 2021-10-08 PROCEDURE — 93662 PR INTRACARD ECHO, THER/DX INTERVENT: ICD-10-PCS | Mod: 26,GC,, | Performed by: INTERNAL MEDICINE

## 2021-10-08 PROCEDURE — C1753 CATH, INTRAVAS ULTRASOUND: HCPCS | Performed by: INTERNAL MEDICINE

## 2021-10-08 PROCEDURE — 93010 EKG 12-LEAD: ICD-10-PCS | Mod: ,,, | Performed by: INTERNAL MEDICINE

## 2021-10-08 PROCEDURE — 99152 MOD SED SAME PHYS/QHP 5/>YRS: CPT | Performed by: INTERNAL MEDICINE

## 2021-10-08 PROCEDURE — 93010 ELECTROCARDIOGRAM REPORT: CPT | Mod: ,,, | Performed by: INTERNAL MEDICINE

## 2021-10-08 PROCEDURE — 99153 MOD SED SAME PHYS/QHP EA: CPT | Performed by: INTERNAL MEDICINE

## 2021-10-08 PROCEDURE — 27000221 HC OXYGEN, UP TO 24 HOURS

## 2021-10-08 PROCEDURE — 25000003 PHARM REV CODE 250: Performed by: INTERNAL MEDICINE

## 2021-10-08 PROCEDURE — 11000001 HC ACUTE MED/SURG PRIVATE ROOM

## 2021-10-08 PROCEDURE — C1751 CATH, INF, PER/CENT/MIDLINE: HCPCS | Performed by: INTERNAL MEDICINE

## 2021-10-08 PROCEDURE — 63600175 PHARM REV CODE 636 W HCPCS: Performed by: INTERNAL MEDICINE

## 2021-10-08 PROCEDURE — 93005 ELECTROCARDIOGRAM TRACING: CPT

## 2021-10-08 PROCEDURE — 86900 BLOOD TYPING SEROLOGIC ABO: CPT | Performed by: INTERNAL MEDICINE

## 2021-10-08 PROCEDURE — 36415 COLL VENOUS BLD VENIPUNCTURE: CPT | Performed by: INTERNAL MEDICINE

## 2021-10-08 PROCEDURE — 93580 TRANSCATH CLOSURE OF ASD: CPT | Mod: GC | Performed by: INTERNAL MEDICINE

## 2021-10-08 PROCEDURE — 93580 TRANSCATH CLOSURE OF ASD: CPT | Mod: GC,,, | Performed by: INTERNAL MEDICINE

## 2021-10-08 PROCEDURE — C1769 GUIDE WIRE: HCPCS | Performed by: INTERNAL MEDICINE

## 2021-10-08 PROCEDURE — 93580 PR PERC CLOS,CONG INTERATRIAL COMMUN W/IMPL: ICD-10-PCS | Mod: GC,,, | Performed by: INTERNAL MEDICINE

## 2021-10-08 PROCEDURE — C1894 INTRO/SHEATH, NON-LASER: HCPCS | Performed by: INTERNAL MEDICINE

## 2021-10-08 PROCEDURE — 27201423 OPTIME MED/SURG SUP & DEVICES STERILE SUPPLY: Performed by: INTERNAL MEDICINE

## 2021-10-08 DEVICE — SEPTAL OCCLUDER
Type: IMPLANTABLE DEVICE | Site: HEART | Status: FUNCTIONAL
Brand: AMPLATZER™

## 2021-10-08 DEVICE — SHEATH TREVISIO 10FRX80MM: Type: IMPLANTABLE DEVICE | Site: GROIN | Status: FUNCTIONAL

## 2021-10-08 RX ORDER — CLOPIDOGREL BISULFATE 75 MG/1
75 TABLET ORAL DAILY
Qty: 180 TABLET | Refills: 0 | Status: SHIPPED | OUTPATIENT
Start: 2021-10-08 | End: 2022-04-19

## 2021-10-08 RX ORDER — FENTANYL CITRATE 50 UG/ML
INJECTION, SOLUTION INTRAMUSCULAR; INTRAVENOUS
Status: DISCONTINUED | OUTPATIENT
Start: 2021-10-08 | End: 2021-10-08 | Stop reason: HOSPADM

## 2021-10-08 RX ORDER — HEPARIN SOD,PORCINE/0.9 % NACL 1000/500ML
INTRAVENOUS SOLUTION INTRAVENOUS
Status: DISCONTINUED | OUTPATIENT
Start: 2021-10-08 | End: 2021-10-08 | Stop reason: HOSPADM

## 2021-10-08 RX ORDER — HEPARIN SODIUM 1000 [USP'U]/ML
INJECTION, SOLUTION INTRAVENOUS; SUBCUTANEOUS
Status: DISCONTINUED | OUTPATIENT
Start: 2021-10-08 | End: 2021-10-08 | Stop reason: HOSPADM

## 2021-10-08 RX ORDER — LIDOCAINE HYDROCHLORIDE 20 MG/ML
INJECTION, SOLUTION INFILTRATION; PERINEURAL
Status: DISCONTINUED | OUTPATIENT
Start: 2021-10-08 | End: 2021-10-08 | Stop reason: HOSPADM

## 2021-10-08 RX ORDER — ACETAMINOPHEN 325 MG/1
650 TABLET ORAL EVERY 4 HOURS PRN
Status: DISCONTINUED | OUTPATIENT
Start: 2021-10-08 | End: 2021-10-08 | Stop reason: HOSPADM

## 2021-10-08 RX ORDER — MIDAZOLAM HYDROCHLORIDE 1 MG/ML
INJECTION, SOLUTION INTRAMUSCULAR; INTRAVENOUS
Status: DISCONTINUED | OUTPATIENT
Start: 2021-10-08 | End: 2021-10-08 | Stop reason: HOSPADM

## 2021-10-08 RX ORDER — CLOPIDOGREL BISULFATE 75 MG/1
75 TABLET ORAL ONCE
Status: DISCONTINUED | OUTPATIENT
Start: 2021-10-08 | End: 2021-10-08

## 2021-10-08 RX ORDER — LIDOCAINE HYDROCHLORIDE AND EPINEPHRINE 10; 10 MG/ML; UG/ML
1 INJECTION, SOLUTION INFILTRATION; PERINEURAL ONCE
Status: COMPLETED | OUTPATIENT
Start: 2021-10-08 | End: 2021-10-08

## 2021-10-08 RX ORDER — DIPHENHYDRAMINE HCL 50 MG
50 CAPSULE ORAL ONCE
Status: COMPLETED | OUTPATIENT
Start: 2021-10-08 | End: 2021-10-08

## 2021-10-08 RX ORDER — NAPROXEN SODIUM 220 MG/1
81 TABLET, FILM COATED ORAL ONCE
Status: COMPLETED | OUTPATIENT
Start: 2021-10-08 | End: 2021-10-08

## 2021-10-08 RX ORDER — ONDANSETRON 8 MG/1
8 TABLET, ORALLY DISINTEGRATING ORAL EVERY 8 HOURS PRN
Status: DISCONTINUED | OUTPATIENT
Start: 2021-10-08 | End: 2021-10-08 | Stop reason: HOSPADM

## 2021-10-08 RX ORDER — DIPHENHYDRAMINE HCL 50 MG
50 CAPSULE ORAL ONCE
Status: DISCONTINUED | OUTPATIENT
Start: 2021-10-08 | End: 2021-10-08 | Stop reason: SDUPTHER

## 2021-10-08 RX ORDER — SODIUM CHLORIDE 9 MG/ML
INJECTION, SOLUTION INTRAVENOUS CONTINUOUS
Status: DISCONTINUED | OUTPATIENT
Start: 2021-10-08 | End: 2021-10-08 | Stop reason: SDUPTHER

## 2021-10-08 RX ORDER — SODIUM CHLORIDE 9 MG/ML
INJECTION, SOLUTION INTRAVENOUS CONTINUOUS
Status: ACTIVE | OUTPATIENT
Start: 2021-10-08 | End: 2021-10-08

## 2021-10-08 RX ADMIN — DIPHENHYDRAMINE HYDROCHLORIDE 50 MG: 50 CAPSULE ORAL at 07:10

## 2021-10-08 RX ADMIN — SODIUM CHLORIDE: 0.9 INJECTION, SOLUTION INTRAVENOUS at 07:10

## 2021-10-08 RX ADMIN — LIDOCAINE HYDROCHLORIDE,EPINEPHRINE BITARTRATE 1 ML: 10; .01 INJECTION, SOLUTION INFILTRATION; PERINEURAL at 02:10

## 2021-10-08 RX ADMIN — ASPIRIN 81 MG CHEWABLE TABLET 81 MG: 81 TABLET CHEWABLE at 07:10

## 2021-10-11 DIAGNOSIS — Q21.10 ASD (ATRIAL SEPTAL DEFECT): Primary | ICD-10-CM

## 2021-10-13 ENCOUNTER — PATIENT MESSAGE (OUTPATIENT)
Dept: CARDIOLOGY | Facility: CLINIC | Age: 71
End: 2021-10-13
Payer: MEDICARE

## 2022-04-10 ENCOUNTER — HISTORICAL (OUTPATIENT)
Dept: ADMINISTRATIVE | Facility: HOSPITAL | Age: 72
End: 2022-04-10
Payer: MEDICARE

## 2022-04-19 ENCOUNTER — OFFICE VISIT (OUTPATIENT)
Dept: CARDIOLOGY | Facility: CLINIC | Age: 72
End: 2022-04-19
Payer: MEDICARE

## 2022-04-19 ENCOUNTER — HOSPITAL ENCOUNTER (OUTPATIENT)
Dept: CARDIOLOGY | Facility: HOSPITAL | Age: 72
Discharge: HOME | End: 2022-04-19
Attending: INTERNAL MEDICINE
Payer: MEDICARE

## 2022-04-19 VITALS
DIASTOLIC BLOOD PRESSURE: 75 MMHG | HEIGHT: 66 IN | BODY MASS INDEX: 22.75 KG/M2 | HEART RATE: 58 BPM | OXYGEN SATURATION: 98 % | SYSTOLIC BLOOD PRESSURE: 187 MMHG | WEIGHT: 141.56 LBS

## 2022-04-19 VITALS
SYSTOLIC BLOOD PRESSURE: 160 MMHG | HEART RATE: 89 BPM | DIASTOLIC BLOOD PRESSURE: 70 MMHG | BODY MASS INDEX: 21.69 KG/M2 | WEIGHT: 135 LBS | HEIGHT: 66 IN

## 2022-04-19 DIAGNOSIS — Q21.10 ASD (ATRIAL SEPTAL DEFECT): ICD-10-CM

## 2022-04-19 DIAGNOSIS — Q21.10 ASD (ATRIAL SEPTAL DEFECT): Primary | ICD-10-CM

## 2022-04-19 DIAGNOSIS — Z51.81 ENCOUNTER FOR THERAPEUTIC DRUG LEVEL MONITORING: ICD-10-CM

## 2022-04-19 LAB
ASCENDING AORTA: 3.01 CM
AV INDEX (PROSTH): 0.54
AV MEAN GRADIENT: 4 MMHG
AV PEAK GRADIENT: 8 MMHG
AV VALVE AREA: 2.19 CM2
AV VELOCITY RATIO: 0.56
BSA FOR ECHO PROCEDURE: 1.69 M2
CV ECHO LV RWT: 0.26 CM
DOP CALC AO PEAK VEL: 1.38 M/S
DOP CALC AO VTI: 27.75 CM
DOP CALC LVOT AREA: 4.1 CM2
DOP CALC LVOT DIAMETER: 2.28 CM
DOP CALC LVOT PEAK VEL: 0.77 M/S
DOP CALC LVOT STROKE VOLUME: 60.88 CM3
DOP CALCLVOT PEAK VEL VTI: 14.92 CM
E WAVE DECELERATION TIME: 189.77 MSEC
E/A RATIO: 0.82
E/E' RATIO: 8.18 M/S
ECHO LV POSTERIOR WALL: 0.68 CM (ref 0.6–1.1)
EJECTION FRACTION: 35 %
FRACTIONAL SHORTENING: 34 % (ref 28–44)
INTERVENTRICULAR SEPTUM: 0.93 CM (ref 0.6–1.1)
LA MAJOR: 4.74 CM
LA MINOR: 4.66 CM
LA WIDTH: 3.52 CM
LEFT ATRIUM SIZE: 3.87 CM
LEFT ATRIUM VOLUME INDEX MOD: 27.3 ML/M2
LEFT ATRIUM VOLUME INDEX: 32.2 ML/M2
LEFT ATRIUM VOLUME MOD: 46.17 CM3
LEFT ATRIUM VOLUME: 54.42 CM3
LEFT INTERNAL DIMENSION IN SYSTOLE: 3.47 CM (ref 2.1–4)
LEFT VENTRICLE DIASTOLIC VOLUME INDEX: 77.25 ML/M2
LEFT VENTRICLE DIASTOLIC VOLUME: 130.56 ML
LEFT VENTRICLE MASS INDEX: 87 G/M2
LEFT VENTRICLE SYSTOLIC VOLUME INDEX: 29.5 ML/M2
LEFT VENTRICLE SYSTOLIC VOLUME: 49.93 ML
LEFT VENTRICULAR INTERNAL DIMENSION IN DIASTOLE: 5.22 CM (ref 3.5–6)
LEFT VENTRICULAR MASS: 147.34 G
LV LATERAL E/E' RATIO: 6.43 M/S
LV SEPTAL E/E' RATIO: 11.25 M/S
MV PEAK A VEL: 0.55 M/S
MV PEAK E VEL: 0.45 M/S
MV STENOSIS PRESSURE HALF TIME: 55.03 MS
MV VALVE AREA P 1/2 METHOD: 4 CM2
PISA TR MAX VEL: 2.47 M/S
RA MAJOR: 4.79 CM
RA PRESSURE: 3 MMHG
RA WIDTH: 4.51 CM
RIGHT VENTRICULAR END-DIASTOLIC DIMENSION: 4.86 CM
RV TISSUE DOPPLER FREE WALL SYSTOLIC VELOCITY 1 (APICAL 4 CHAMBER VIEW): 9.12 CM/S
SINUS: 3.08 CM
STJ: 2.47 CM
TDI LATERAL: 0.07 M/S
TDI SEPTAL: 0.04 M/S
TDI: 0.06 M/S
TR MAX PG: 24 MMHG
TV REST PULMONARY ARTERY PRESSURE: 27 MMHG

## 2022-04-19 PROCEDURE — 93306 TTE W/DOPPLER COMPLETE: CPT | Mod: 26,,, | Performed by: INTERNAL MEDICINE

## 2022-04-19 PROCEDURE — 99999 PR PBB SHADOW E&M-EST. PATIENT-LVL IV: CPT | Mod: PBBFAC,,, | Performed by: INTERNAL MEDICINE

## 2022-04-19 PROCEDURE — 99212 PR OFFICE/OUTPT VISIT, EST, LEVL II, 10-19 MIN: ICD-10-PCS | Mod: S$PBB,,, | Performed by: INTERNAL MEDICINE

## 2022-04-19 PROCEDURE — 99999 PR PBB SHADOW E&M-EST. PATIENT-LVL IV: ICD-10-PCS | Mod: PBBFAC,,, | Performed by: INTERNAL MEDICINE

## 2022-04-19 PROCEDURE — 99214 OFFICE O/P EST MOD 30 MIN: CPT | Mod: PBBFAC,25 | Performed by: INTERNAL MEDICINE

## 2022-04-19 PROCEDURE — 99212 OFFICE O/P EST SF 10 MIN: CPT | Mod: S$PBB,,, | Performed by: INTERNAL MEDICINE

## 2022-04-19 PROCEDURE — 93306 TTE W/DOPPLER COMPLETE: CPT

## 2022-04-19 PROCEDURE — 93306 ECHO (CUPID ONLY): ICD-10-PCS | Mod: 26,,, | Performed by: INTERNAL MEDICINE

## 2022-04-19 NOTE — PROGRESS NOTES
Procedure explained. 22 g sl started in right forearm for bubble study. Bubble study done x 2 with and without valsalva. Tolerated well. Sl d/mateo after. Pressure applied..

## 2022-04-19 NOTE — PROGRESS NOTES
PCP - Priscila Wood MD  Referring Physician: Dr. Moseley    Subjective:   Patient ID:  Rickey Navarro is a 71 y.o. male with past medical history of:  -ASD s/p closure in 2021  -Pulmonic stenosis s/p dilation of pulmonary valve at age 6 in [place]  -Severe pulmonary regurgitation  -Prior CVA  -HTN  -HLD    Who presents to clinic for follow up today. Patient had septum secundum ASD closure with Dr. Zuniga in Oct 2021. Patient reports that since the procedure he has been asymptomatic. He specifically denies chest pain, shortness of breath, palpitations. He states he is able to do his activities of daily living without any issue. Patient reports that he can dig a ditch for 30 yards. He has been compliant with his ASA/Plavix. He has not had any more TIA/stroke-like symptoms.    History:     Social History     Tobacco Use    Smoking status: Former Smoker     Quit date: 1988     Years since quittin.5    Smokeless tobacco: Never Used   Substance Use Topics    Alcohol use: Never     Family History   Problem Relation Age of Onset    Heart attack Mother     Asthma Father     Arrhythmia Father     Cancer Brother        Meds:     Review of patient's allergies indicates:   Allergen Reactions    Codeine Other (See Comments) and Nausea And Vomiting       Current Outpatient Medications:     aspirin (ECOTRIN) 81 MG EC tablet, Take 81 mg by mouth once daily., Disp: , Rfl:     atorvastatin (LIPITOR) 40 MG tablet, Take 1 tablet (40 mg total) by mouth once daily., Disp: 30 tablet, Rfl: 0    cetirizine (ZYRTEC) 10 MG tablet, Take 10 mg by mouth once daily., Disp: , Rfl:     clopidogreL (PLAVIX) 75 mg tablet, Take 1 tablet (75 mg total) by mouth once daily., Disp: 180 tablet, Rfl: 0    diazePAM (VALIUM) 10 MG Tab, Take 10 mg by mouth as needed. Pt takes daily, Disp: , Rfl:     levothyroxine (SYNTHROID) 100 MCG tablet, Take 150 mcg by mouth before breakfast., Disp: , Rfl:     lisinopriL 10 MG tablet,  "Take 10 mg by mouth 2 (two) times daily., Disp: , Rfl:     omeprazole (PRILOSEC) 20 MG capsule, Take 20 mg by mouth once daily. Prn as needed, Disp: , Rfl:     traZODone (DESYREL) 50 MG tablet, Take 50 mg by mouth every evening., Disp: , Rfl:     zolpidem (AMBIEN) 10 mg Tab, Take 5 mg by mouth nightly as needed., Disp: , Rfl:     fluticasone propionate (FLONASE) 50 mcg/actuation nasal spray, 1 spray by Each Nostril route as needed. Prn as needed, Disp: , Rfl:         Objective:   BP (!) 187/75 (BP Location: Right arm, Patient Position: Sitting, BP Method: Large (Automatic))   Pulse (!) 58   Ht 5' 6" (1.676 m)   Wt 64.2 kg (141 lb 8.6 oz)   SpO2 98%   BMI 22.84 kg/m²     Physical Exam  Gen: No apparent distress, resting comfortably  HEENT: Pupils equal and reactive to light  Cardio: Regular rate, point of maximal impulse not displaced, no murmur noted, 2+ radial pulses bilaterally, 2+ DP pulses bilaterally  Resp: CTAB, no wheezing  Abd: Soft, non-tender, non-distended  Skin: Warm, dry, no peripheral edema noted  Neuro: Alert and oriented x3  Psych: Normal mood and affect      Labs:     Lab Results   Component Value Date     09/28/2021    K 4.8 09/28/2021     09/28/2021    CO2 24 09/28/2021    BUN 9 09/28/2021    CREATININE 1.3 09/28/2021    ANIONGAP 13 09/28/2021     Lab Results   Component Value Date    HGBA1C 6.2 (H) 05/26/2021     No results found for: BNP, BNPTRIAGEBLO    Lab Results   Component Value Date    WBC 6.20 09/28/2021    HGB 18.8 (H) 09/28/2021    HCT 56.4 (H) 09/28/2021     09/28/2021    GRAN 3.4 09/28/2021    GRAN 55.3 09/28/2021     Lab Results   Component Value Date    CHOL 221 (H) 05/26/2021    HDL 77 (H) 05/26/2021    LDLCALC 125.8 05/26/2021    TRIG 91 05/26/2021       Lab Results   Component Value Date     09/28/2021    K 4.8 09/28/2021     09/28/2021    CO2 24 09/28/2021    BUN 9 09/28/2021    CREATININE 1.3 09/28/2021    ANIONGAP 13 09/28/2021     Lab " "Results   Component Value Date    HGBA1C 6.2 (H) 05/26/2021     No results found for: BNP, BNPTRIAGEBLO Lab Results   Component Value Date    WBC 6.20 09/28/2021    HGB 18.8 (H) 09/28/2021    HCT 56.4 (H) 09/28/2021     09/28/2021    GRAN 3.4 09/28/2021    GRAN 55.3 09/28/2021     Lab Results   Component Value Date    CHOL 221 (H) 05/26/2021    HDL 77 (H) 05/26/2021    LDLCALC 125.8 05/26/2021    TRIG 91 05/26/2021                Cardiovascular Imaging:     AUSTIN from today :   · The left ventricle is normal in size with moderately decreased systolic function. The estimated ejection fraction is 35%.  · There is abnormal septal wall motion from wide QRS (IVCD) conduction  · Moderate right ventricular enlargement with mildly reduced right ventricular systolic function.  · Grade I left ventricular diastolic dysfunction.  · Right atrial enlargement.  · Severe (wide-open) pulmonic regurgitation with a markedly dilated pulmonary artery trunk.  · Mild mitral regurgitation.  · Mild tricuspid regurgitation.  · The estimated right ventricular systolic pressure is 27 mmHg.  · Normal central venous pressure (3 mmHg).  · There is an atrial septum closure device present. With IV administration of agitated saline, there is significant right to left shunting evident from the anterior/inferior aspect of the closure device. This is seen both with Valsalva maneuver and at rest.    Assessment & Plan:     1. Septum secundum ASD s/p closure Oct 2021  -Patient s/p ASD closure in Oct 2021 using OCCLUDER SEPTAL AGA 22MM  -AUSTIN from today showing significant right to left shunting evident from the anterior/inferior aspect of the closure device  -Discontinue Plavix, continue aspirin  -Patient to get repeat Echos with his cardiologist Dr. Moseley  -Return to clinic prn    2. Carotid stenosis  -CTA head on 5/26/21 showed "Calcific atherosclerosis of the bilateral carotid bifurcations with approximately 70% stenosis on the left and 50% stenosis " "on the right."  -Continue ASA 81 qd    3. Pulmonic valve stenosis  -S/p dilation at age 6 in [place]    4. Severe pulmonic regurgitation  -Seen on AUSTIN today as well as evidence of RV dilation  -F/u with primary cardiologist    5. Newly reduced EF  -EF of 35% on most recent AUSTIN, unclear etiology as patient had reportedly clean LHC before procedure in October  -F/u with outpatient Cardiology      Case staffed with Dr. Zuniga. RTC prn    Signed:  Carson Juan MD  [facility] PGY-4  Pager number: [phone]    Staff attestation to follow.  Interventional Cardiology Staff  I have personally taken the history and examined this patient. I have discussed and agree with the resident's findings and plan as documented in the resident's note.  71-year-old male who was referred by Dr. Moseley and Jaime for ASD closure 6 months ago.  He returns today for echo bubble study.  At age 6 he had pulmonary finger valvotomy and was referred after CVA approximately 6 months ago.  A 22 mm ASD closure device was placed in the interatrial septum after right heart catheterization by me and left heart catheterization by Dr. Burr.  Patient has trivial tricuspid regurgitation his pulmonary artery pressures are improved and there is a residual bubble leak at 3-4 beats that is minimal.  We can continue aspirin, discontinue Plavix, follow-up with Dr. Burr and 3 months and follow-up with me p.r.nLeana.    Panda Zuniga    "

## 2022-04-25 VITALS
HEIGHT: 65 IN | DIASTOLIC BLOOD PRESSURE: 100 MMHG | WEIGHT: 135.56 LBS | BODY MASS INDEX: 22.59 KG/M2 | SYSTOLIC BLOOD PRESSURE: 160 MMHG

## 2024-05-16 ENCOUNTER — HOSPITAL ENCOUNTER (OUTPATIENT)
Dept: RADIOLOGY | Facility: HOSPITAL | Age: 74
Discharge: HOME | End: 2024-05-16
Attending: INTERNAL MEDICINE
Payer: MEDICARE

## 2024-05-16 DIAGNOSIS — R05.1 ACUTE COUGH: ICD-10-CM

## 2024-05-16 PROCEDURE — 71046 X-RAY EXAM CHEST 2 VIEWS: CPT | Mod: TC

## 2024-11-18 ENCOUNTER — HOSPITAL ENCOUNTER (OUTPATIENT)
Dept: RADIOLOGY | Facility: HOSPITAL | Age: 74
Discharge: HOME | End: 2024-11-18
Attending: INTERNAL MEDICINE
Payer: MEDICARE

## 2024-11-18 DIAGNOSIS — M25.561 RIGHT KNEE PAIN: ICD-10-CM

## 2024-11-18 PROCEDURE — 73560 X-RAY EXAM OF KNEE 1 OR 2: CPT | Mod: TC,RT

## 2024-12-18 ENCOUNTER — HOSPITAL ENCOUNTER (EMERGENCY)
Facility: HOSPITAL | Age: 74
Discharge: HOME | End: 2024-12-18
Attending: INTERNAL MEDICINE
Payer: MEDICARE

## 2024-12-18 VITALS
OXYGEN SATURATION: 98 % | HEART RATE: 70 BPM | HEIGHT: 66 IN | DIASTOLIC BLOOD PRESSURE: 82 MMHG | SYSTOLIC BLOOD PRESSURE: 172 MMHG | BODY MASS INDEX: 22.84 KG/M2 | TEMPERATURE: 98 F | RESPIRATION RATE: 18 BRPM

## 2024-12-18 DIAGNOSIS — M71.21 SYNOVIAL CYST OF RIGHT POPLITEAL SPACE: Primary | ICD-10-CM

## 2024-12-18 DIAGNOSIS — M25.422 SWELLING OF JOINT OF UPPER ARM, LEFT: ICD-10-CM

## 2024-12-18 DIAGNOSIS — M79.89 LEG SWELLING: ICD-10-CM

## 2024-12-18 LAB — D DIMER PPP IA.FEU-MCNC: 4.47 UG/ML FEU (ref 0–0.5)

## 2024-12-18 PROCEDURE — 85379 FIBRIN DEGRADATION QUANT: CPT | Performed by: PHYSICIAN ASSISTANT

## 2024-12-18 PROCEDURE — 99284 EMERGENCY DEPT VISIT MOD MDM: CPT | Mod: 25

## 2024-12-18 NOTE — ED PROVIDER NOTES
Encounter Date: 2024       History     Chief Complaint   Patient presents with    Knee Pain     See [facility] for details.      The history is provided by the patient. No  was used.     Review of patient's allergies indicates:   Allergen Reactions    Codeine Other (See Comments) and Nausea And Vomiting     Past Medical History:   Diagnosis Date    Dyspnea on exertion     GERD (gastroesophageal reflux disease)     Heart murmur     Hyperlipidemia     Hypertension     Pulmonary valve stenosis      Past Surgical History:   Procedure Laterality Date    RIGHT HEART CATHETERIZATION Right 10/8/2021    Procedure: INSERTION, CATHETER, RIGHT HEART;  Surgeon: Panda Zuniga MD;  Location: [facility] CATH LAB;  Service: Cardiology;  Laterality: Right;    TRANSESOPHAGEAL ECHOCARDIOGRAPHY N/A 2021    Procedure: ECHOCARDIOGRAM, TRANSESOPHAGEAL;  Surgeon: [facility] Diagnostic Provider;  Location: [facility] EP LAB;  Service: Cardiology;  Laterality: N/A;     Family History   Problem Relation Name Age of Onset    Heart attack Mother      Asthma Father      Arrhythmia Father      Cancer Brother 2      Social History     Tobacco Use    Smoking status: Former     Current packs/day: 0.00     Types: Cigarettes     Quit date: 1988     Years since quittin.2    Smokeless tobacco: Never   Substance Use Topics    Alcohol use: Never    Drug use: Never     Review of Systems   Constitutional: Negative.  Negative for activity change, appetite change, diaphoresis, fatigue and fever.   HENT:  Negative for rhinorrhea and sinus pressure.    Eyes: Negative.    Respiratory: Negative.  Negative for chest tightness.    Cardiovascular:  Negative for chest pain.   Gastrointestinal: Negative.  Negative for abdominal distention and abdominal pain.   Endocrine: Negative.    Genitourinary: Negative.    Musculoskeletal: Negative.  Negative for arthralgias.   Allergic/Immunologic: Negative.    Neurological:  Negative for dizziness and  headaches.   Hematological: Negative.    Psychiatric/Behavioral: Negative.         Physical Exam     Initial Vitals [12/18/24 1547]   BP Pulse Resp Temp SpO2   (!) 184/84 63 16 98.1 °F (36.7 °C) 96 %      MAP       --         Physical Exam    Nursing note and vitals reviewed.  Constitutional: He appears well-developed and well-nourished. He is cooperative. No distress.   HENT:   Head: Normocephalic and atraumatic. Not macrocephalic.   Right Ear: Tympanic membrane normal. Tympanic membrane is not erythematous.   Left Ear: Tympanic membrane normal. Tympanic membrane is not erythematous.   Nose: No mucosal edema. Right sinus exhibits no frontal sinus tenderness. Left sinus exhibits no frontal sinus tenderness. Mouth/Throat: Mucous membranes are normal. No oropharyngeal exudate.   Eyes: Conjunctivae and EOM are normal. Pupils are equal, round, and reactive to light. Right eye exhibits no discharge. Left eye exhibits no discharge.   Neck: Neck supple. No tracheal deviation present.   Normal range of motion.  Cardiovascular:  Normal rate and regular rhythm.           Pulmonary/Chest: Effort normal. No respiratory distress. He has no decreased breath sounds. He has no wheezes.   Musculoskeletal:         General: Normal range of motion.      Cervical back: Normal range of motion and neck supple.      Comments: No visible swelling in upper or lower extremities. +2radial pulses bilaterally.+2 dorsali pedis pulses bilaterally. No erythema noted. Skin intact.     Obvious crepitus and arthritis to right knee. Prepatellar effusion.       Lymphadenopathy:        Head (right side): No submental adenopathy present.        Head (left side): No submental adenopathy present.     He has no cervical adenopathy.   Neurological: He is alert and oriented to person, place, and time. He has normal strength. No cranial nerve deficit. GCS score is 15. GCS eye subscore is 4. GCS verbal subscore is 5. GCS motor subscore is 6.   Skin: Skin is  warm.   Psychiatric: He has a normal mood and affect. His behavior is normal. Judgment and thought content normal.         ED Course   Procedures  Labs Reviewed   D DIMER, QUANTITATIVE - Abnormal       Result Value    D-Dimer 4.47 (*)           Imaging Results              US Upper Extremity Veins Left (Final result)  Result time 12/18/24 18:19:09      Final result by Ignacio Brink MD (12/18/24 18:19:09)                   Impression:      No thrombus in central veins of the left upper extremity.      Electronically signed by: Ignacio Brink MD  Date:    12/18/2024  Time:    18:19               Narrative:    EXAMINATION:  US UPPER EXTREMITY VEINS LEFT    CLINICAL HISTORY:  left upper extremity swelling;    TECHNIQUE:  Duplex and color flow Doppler evaluation and dynamic compression was performed of the left upper extremity veins.    COMPARISON:  None    FINDINGS:  Central veins: The internal jugular, subclavian, and axillary veins are patent and free of thrombus.    Arm veins: The brachial, basilic, and cephalic veins are patent and compressible.    Other findings: None.                                       US Lower Extremity Veins Bilateral (Final result)  Result time 12/18/24 18:18:53      Final result by Ignacio Brink MD (12/18/24 18:18:53)                   Impression:      1. No evidence of deep venous thrombosis in either lower extremity.      Electronically signed by: Ignacio Brink MD  Date:    12/18/2024  Time:    18:18               Narrative:    EXAMINATION:  US LOWER EXTREMITY VEINS BILATERAL    CLINICAL HISTORY:  Other specified soft tissue disorders    TECHNIQUE:  Duplex and color flow Doppler and dynamic compression was performed of the bilateral lower extremity veins was performed.    COMPARISON:  No pertinent prior study is available for comparison.    FINDINGS:  Right thigh veins: The common femoral, femoral, popliteal, upper greater saphenous, and deep femoral veins are patent and  "free of thrombus. The veins are normally compressible and have normal phasic flow and augmentation response.    Right calf veins: The visualized calf veins are patent.    Left thigh veins: The common femoral, femoral, popliteal, upper greater saphenous, and deep femoral veins are patent and free of thrombus. The veins are normally compressible and have normal phasic flow and augmentation response.    Left calf veins: The visualized calf veins are patent.    Other: None                                       Medications - No data to display  Medical Decision Making  74yoWM w/hx of GERD, HTN, and HLD presents to the ER with right knee pain "for years" and "left arm swelling" x 2 days. Patient "Wants to do something about this baker's cyst today" and has not seen orthopedic doctor since noticing this "years ago". Patient also taken off amlodipine 2 weeks ago for bilateral leg swelling and notices his left arm is swollen and is "very concerned about it". No inciting injury. No other medication changes. Denies nausea,vomiting, abdominal pain, fever, chest pain, chills, or shortness of breath. Patient is non-compliant per himself and relative with him. Not taking blood thinners.    Problems Addressed:  Leg swelling: chronic illness or injury  Swelling of joint of upper arm, left: chronic illness or injury  Synovial cyst of right popliteal space: chronic illness or injury with exacerbation, progression, or side effects of treatment     Details: Differential diagnosis included but not limited to:  DVT, baker's cyst, arthritis, HTN     Patient seemed concerned about looked over extremity swelling.  There was no visible swelling to myself or the staff but patient seemed to think that it was compared to his baseline.  Because of this ultrasounds were ordered especially with elevated D-dimer and risk factors. Negative u/s at this time. Strict ER precautions given. Recommend wearing compression stockings with lower edema noted he " says at the end of the day.  Patient was offered Ace wrap, pain medicine steroid under traction, declined all of these interventions.  Patient will follow up with cardiologist orthopedic doctor.  He will also follow up primary care.  Patient verbalized understanding this plan.  All questions asked and answered at time of visit.  Discussed patient care with Dr. Contreras who agrees with plan.       Amount and/or Complexity of Data Reviewed  Labs: ordered. Decision-making details documented in ED Course.  Radiology: ordered. Decision-making details documented in ED Course.               ED Course as of 12/18/24 1851   Wed Dec 18, 2024   1844 Declined pain medicine   [ST]   1844 Declined ace wrap [ST]   1844 Declined steroid injection [ST]      ED Course User Index  [ST] Gala Kidd PA                           Clinical Impression:  Final diagnoses:  [M71.21] Synovial cyst of right popliteal space (Primary)  [M25.422] Swelling of joint of upper arm, left  [M79.89] Leg swelling          ED Disposition Condition    Discharge Stable          ED Prescriptions    None       Follow-up Information       Follow up With Specialties Details Why Contact Info    [facility] - Emergency Dept Emergency Medicine  As needed, If symptoms worsen [address] [facility] [id number] [phone]    Maxime Galindo MD Internal Medicine Schedule an appointment as soon as possible for a visit today  [facility] [facility] [address]  [facility] [address]  [phone]      [facility] - Orthopaedics Orthopedics Call   [address]  [facility] [id number] [phone]        This note was typed partially using voice recognition software.  Please be reminded that not all corrections/addendums to grammar may have been made prior to closing of this chart.       Gala Kidd PA  12/18/24 1851

## 2024-12-18 NOTE — DISCHARGE INSTRUCTIONS
ICE AND ELEVATE LEGS    Wear compression stockings during the day     Walk 30minutes at least 3 times per week     Follow up with cardiologist ASAP     Follow up with PCP ASAP    Orthopedic referral made

## 2025-02-24 ENCOUNTER — HOSPITAL ENCOUNTER (OUTPATIENT)
Dept: RADIOLOGY | Facility: HOSPITAL | Age: 75
Discharge: HOME | End: 2025-02-24
Attending: INTERNAL MEDICINE
Payer: MEDICARE

## 2025-02-24 DIAGNOSIS — R59.0 AXILLARY LYMPHADENOPATHY: ICD-10-CM

## 2025-02-24 PROCEDURE — 71250 CT THORAX DX C-: CPT | Mod: TC

## 2025-02-25 ENCOUNTER — HOSPITAL ENCOUNTER (OUTPATIENT)
Dept: RADIOLOGY | Facility: HOSPITAL | Age: 75
Discharge: HOME | End: 2025-02-25
Attending: INTERNAL MEDICINE
Payer: MEDICARE

## 2025-02-25 DIAGNOSIS — R59.0 VIRCHOW'S NODE: ICD-10-CM

## 2025-02-25 DIAGNOSIS — R59.0 AXILLARY LYMPHADENOPATHY: ICD-10-CM

## 2025-02-25 PROCEDURE — 76882 US LMTD JT/FCL EVL NVASC XTR: CPT | Mod: TC,LT

## 2025-02-28 ENCOUNTER — HOSPITAL ENCOUNTER (OUTPATIENT)
Dept: RADIOLOGY | Facility: HOSPITAL | Age: 75
Discharge: HOME | End: 2025-02-28
Attending: INTERNAL MEDICINE
Payer: MEDICARE

## 2025-02-28 DIAGNOSIS — R59.0 PELVIC LYMPHADENOPATHY: ICD-10-CM

## 2025-02-28 DIAGNOSIS — R59.0 LYMPHADENOPATHY, INGUINAL: ICD-10-CM

## 2025-02-28 LAB
CREAT SERPL-MCNC: 0.7 MG/DL (ref 0.5–1.4)
SAMPLE: NORMAL

## 2025-02-28 PROCEDURE — 25500020 PHARM REV CODE 255: Performed by: INTERNAL MEDICINE

## 2025-02-28 PROCEDURE — 74177 CT ABD & PELVIS W/CONTRAST: CPT | Mod: TC

## 2025-02-28 RX ORDER — DIATRIZOATE MEGLUMINE AND DIATRIZOATE SODIUM 660; 100 MG/ML; MG/ML
30 SOLUTION ORAL; RECTAL
Status: COMPLETED | OUTPATIENT
Start: 2025-02-28 | End: 2025-02-28

## 2025-02-28 RX ORDER — IOPAMIDOL 755 MG/ML
100 INJECTION, SOLUTION INTRAVASCULAR
Status: COMPLETED | OUTPATIENT
Start: 2025-02-28 | End: 2025-02-28

## 2025-02-28 RX ADMIN — DIATRIZOATE MEGLUMINE AND DIATRIZOATE SODIUM 30 ML: 660; 100 LIQUID ORAL; RECTAL at 04:02

## 2025-02-28 RX ADMIN — IOPAMIDOL 100 ML: 755 INJECTION, SOLUTION INTRAVENOUS at 04:02

## 2025-03-03 ENCOUNTER — HOSPITAL ENCOUNTER (OUTPATIENT)
Dept: RADIOLOGY | Facility: HOSPITAL | Age: 75
Discharge: HOME | End: 2025-03-03
Attending: SURGERY
Payer: MEDICARE

## 2025-03-03 ENCOUNTER — CLINICAL SUPPORT (OUTPATIENT)
Dept: RESPIRATORY THERAPY | Facility: HOSPITAL | Age: 75
End: 2025-03-03
Attending: SURGERY
Payer: MEDICARE

## 2025-03-03 DIAGNOSIS — Z01.811 PRE-OP CHEST EXAM: ICD-10-CM

## 2025-03-03 DIAGNOSIS — R59.0 VIRCHOW'S NODE: ICD-10-CM

## 2025-03-03 DIAGNOSIS — R59.0 VIRCHOW'S NODE: Primary | ICD-10-CM

## 2025-03-03 LAB
OHS QRS DURATION: 180 MS
OHS QTC CALCULATION: 499 MS

## 2025-03-03 PROCEDURE — 93010 ELECTROCARDIOGRAM REPORT: CPT | Mod: ,,, | Performed by: STUDENT IN AN ORGANIZED HEALTH CARE EDUCATION/TRAINING PROGRAM

## 2025-03-03 PROCEDURE — 93005 ELECTROCARDIOGRAM TRACING: CPT

## 2025-03-03 RX ORDER — PHENOBARBITAL 100 MG/1
1 TABLET ORAL NIGHTLY
COMMUNITY

## 2025-03-03 RX ORDER — METHOCARBAMOL 500 MG/1
500 TABLET, FILM COATED ORAL 2 TIMES DAILY
COMMUNITY

## 2025-03-03 RX ORDER — METOPROLOL TARTRATE 25 MG/1
25 TABLET, FILM COATED ORAL 2 TIMES DAILY
COMMUNITY
Start: 2024-12-26

## 2025-03-03 RX ORDER — RIVAROXABAN 20 MG/1
20 TABLET, FILM COATED ORAL EVERY MORNING
COMMUNITY
Start: 2024-11-19

## 2025-03-03 RX ORDER — DICLOFENAC POTASSIUM 50 MG/1
50 TABLET, FILM COATED ORAL 2 TIMES DAILY PRN
COMMUNITY

## 2025-03-03 NOTE — DISCHARGE INSTRUCTIONS
Discharge instructions reviewed with patient  Please take all medications as prescribed by the provider  Please complete drain care daily and as needed:  Remove old dressing  Cleanse with soap and water  Pat dry with guaze  Apply folded guaze and island border dressing to area    Ensure drain is secured with stitches and do not pull on drain  Please continue over the counter pain medications as needed      THANK YOU FOR CHOOSING [facility] FOR YOUR CARE!!  IT WAS A PLEASURE TAKING CARE OF YOU!!

## 2025-03-05 ENCOUNTER — ANESTHESIA EVENT (OUTPATIENT)
Dept: SURGERY | Facility: HOSPITAL | Age: 75
End: 2025-03-05
Payer: MEDICARE

## 2025-03-05 NOTE — ANESTHESIA PREPROCEDURE EVALUATION
03/05/2025  Rickey Navarro is a 74 y.o., male.      Pre-op Assessment    I have reviewed the Patient Summary Reports.     I have reviewed the Nursing Notes. I have reviewed the NPO Status.   I have reviewed the Medications.     Review of Systems  Anesthesia Hx:             Denies Family Hx of Anesthesia complications.    Denies Personal Hx of Anesthesia complications.                    Social:  Former Smoker, No Alcohol Use       Hematology/Oncology:  Hematology Normal   Oncology Normal                                   EENT/Dental:  EENT/Dental Normal           Cardiovascular:    Pacemaker Hypertension, well controlled   CAD  asymptomatic            ECG has been reviewed. Echo 2022, est LVEF 35%                             Pulmonary:      Shortness of breath  Sleep Apnea                Renal/:  Renal/ Normal                 Hepatic/GI:     GERD, well controlled                Musculoskeletal:  Musculoskeletal Normal                Neurological:   CVA    Seizures, well controlled                                Endocrine:  Endocrine Normal            Dermatological:  Skin Normal    Psych:  Psychiatric Normal                    Physical Exam  General: Cooperative, Alert and Oriented    Airway:  Mallampati: II   Mouth Opening: Normal  TM Distance: Normal  Tongue: Normal  Neck ROM: Normal ROM    Dental:  Intact        Anesthesia Plan  Type of Anesthesia, risks & benefits discussed:    Anesthesia Type: Gen Supraglottic Airway  Intra-op Monitoring Plan: Standard ASA Monitors  Post Op Pain Control Plan: multimodal analgesia  Induction:  IV  Informed Consent: Informed consent signed with the Patient and all parties understand the risks and agree with anesthesia plan.  All questions answered. Patient consented to blood products? Yes  ASA Score: 3    Ready For Surgery From Anesthesia Perspective.     .

## 2025-03-06 ENCOUNTER — HOSPITAL ENCOUNTER (OUTPATIENT)
Facility: HOSPITAL | Age: 75
LOS: 4 days | Discharge: HOME | End: 2025-03-10
Attending: SURGERY | Admitting: SURGERY
Payer: MEDICARE

## 2025-03-06 ENCOUNTER — ANESTHESIA (OUTPATIENT)
Dept: SURGERY | Facility: HOSPITAL | Age: 75
End: 2025-03-06
Payer: MEDICARE

## 2025-03-06 DIAGNOSIS — R59.0 LOCALIZED ENLARGED LYMPH NODES: Primary | ICD-10-CM

## 2025-03-06 DIAGNOSIS — R59.9 ENLARGED LYMPH NODES: ICD-10-CM

## 2025-03-06 LAB
APTT PPP: 41.2 SECONDS (ref 24.2–35.9)
INR PPP: 1.1
PROTHROMBIN TIME: 14.2 SECONDS (ref 12.4–14.9)

## 2025-03-06 PROCEDURE — 88184 FLOWCYTOMETRY/ TC 1 MARKER: CPT | Performed by: SURGERY

## 2025-03-06 PROCEDURE — 88342 IMHCHEM/IMCYTCHM 1ST ANTB: CPT

## 2025-03-06 PROCEDURE — 63600175 PHARM REV CODE 636 W HCPCS: Performed by: SURGERY

## 2025-03-06 PROCEDURE — 27201423 OPTIME MED/SURG SUP & DEVICES STERILE SUPPLY: Performed by: SURGERY

## 2025-03-06 PROCEDURE — 88341 IMHCHEM/IMCYTCHM EA ADD ANTB: CPT

## 2025-03-06 PROCEDURE — 37000009 HC ANESTHESIA EA ADD 15 MINS: Performed by: SURGERY

## 2025-03-06 PROCEDURE — 25000003 PHARM REV CODE 250: Performed by: NURSE ANESTHETIST, CERTIFIED REGISTERED

## 2025-03-06 PROCEDURE — 36000707: Performed by: SURGERY

## 2025-03-06 PROCEDURE — 25000003 PHARM REV CODE 250: Performed by: SURGERY

## 2025-03-06 PROCEDURE — 36000706: Performed by: SURGERY

## 2025-03-06 PROCEDURE — 07B60ZX EXCISION OF LEFT AXILLARY LYMPHATIC, OPEN APPROACH, DIAGNOSTIC: ICD-10-PCS | Performed by: SURGERY

## 2025-03-06 PROCEDURE — 36415 COLL VENOUS BLD VENIPUNCTURE: CPT | Performed by: SURGERY

## 2025-03-06 PROCEDURE — 37000008 HC ANESTHESIA 1ST 15 MINUTES: Performed by: SURGERY

## 2025-03-06 PROCEDURE — 71000016 HC POSTOP RECOV ADDL HR: Performed by: SURGERY

## 2025-03-06 PROCEDURE — 71000015 HC POSTOP RECOV 1ST HR: Performed by: SURGERY

## 2025-03-06 PROCEDURE — 85730 THROMBOPLASTIN TIME PARTIAL: CPT | Performed by: SURGERY

## 2025-03-06 PROCEDURE — 63600175 PHARM REV CODE 636 W HCPCS: Mod: JZ,TB | Performed by: SURGERY

## 2025-03-06 PROCEDURE — 25000003 PHARM REV CODE 250: Performed by: ANESTHESIOLOGY

## 2025-03-06 PROCEDURE — 88312 SPECIAL STAINS GROUP 1: CPT

## 2025-03-06 PROCEDURE — 88325 CONSLTJ COMPRE RVW REC REPRT: CPT

## 2025-03-06 PROCEDURE — C1729 CATH, DRAINAGE: HCPCS | Performed by: SURGERY

## 2025-03-06 PROCEDURE — 25000003 PHARM REV CODE 250: Performed by: INTERNAL MEDICINE

## 2025-03-06 PROCEDURE — 11000001 HC ACUTE MED/SURG PRIVATE ROOM

## 2025-03-06 PROCEDURE — 63600175 PHARM REV CODE 636 W HCPCS: Performed by: NURSE ANESTHETIST, CERTIFIED REGISTERED

## 2025-03-06 PROCEDURE — 88185 FLOWCYTOMETRY/TC ADD-ON: CPT

## 2025-03-06 PROCEDURE — 85610 PROTHROMBIN TIME: CPT | Performed by: SURGERY

## 2025-03-06 PROCEDURE — 71000033 HC RECOVERY, INTIAL HOUR: Performed by: SURGERY

## 2025-03-06 PROCEDURE — 63600175 PHARM REV CODE 636 W HCPCS: Performed by: ANESTHESIOLOGY

## 2025-03-06 RX ORDER — LIDOCAINE HYDROCHLORIDE 20 MG/ML
INJECTION, SOLUTION EPIDURAL; INFILTRATION; INTRACAUDAL; PERINEURAL
Status: DISCONTINUED | OUTPATIENT
Start: 2025-03-06 | End: 2025-03-06

## 2025-03-06 RX ORDER — ACETAMINOPHEN 500 MG
1000 TABLET ORAL
Status: COMPLETED | OUTPATIENT
Start: 2025-03-06 | End: 2025-03-06

## 2025-03-06 RX ORDER — LEVOTHYROXINE SODIUM 75 UG/1
150 TABLET ORAL
Status: DISCONTINUED | OUTPATIENT
Start: 2025-03-07 | End: 2025-03-10 | Stop reason: HOSPADM

## 2025-03-06 RX ORDER — GLUCAGON 1 MG
1 KIT INJECTION
Status: DISCONTINUED | OUTPATIENT
Start: 2025-03-06 | End: 2025-03-06

## 2025-03-06 RX ORDER — TRANEXAMIC ACID 100 MG/ML
INJECTION, SOLUTION INTRAVENOUS
Status: DISCONTINUED | OUTPATIENT
Start: 2025-03-06 | End: 2025-03-06

## 2025-03-06 RX ORDER — LISINOPRIL 10 MG/1
10 TABLET ORAL EVERY MORNING
Status: DISCONTINUED | OUTPATIENT
Start: 2025-03-07 | End: 2025-03-10 | Stop reason: HOSPADM

## 2025-03-06 RX ORDER — FENTANYL CITRATE 50 UG/ML
25 INJECTION, SOLUTION INTRAMUSCULAR; INTRAVENOUS EVERY 5 MIN PRN
Status: DISCONTINUED | OUTPATIENT
Start: 2025-03-06 | End: 2025-03-06

## 2025-03-06 RX ORDER — BUPIVACAINE HYDROCHLORIDE 2.5 MG/ML
INJECTION, SOLUTION EPIDURAL; INFILTRATION; INTRACAUDAL; PERINEURAL
Status: DISCONTINUED | OUTPATIENT
Start: 2025-03-06 | End: 2025-03-06 | Stop reason: HOSPADM

## 2025-03-06 RX ORDER — FUROSEMIDE 10 MG/ML
20 INJECTION INTRAMUSCULAR; INTRAVENOUS ONCE
Status: DISCONTINUED | OUTPATIENT
Start: 2025-03-06 | End: 2025-03-10 | Stop reason: HOSPADM

## 2025-03-06 RX ORDER — CEFAZOLIN SODIUM 2 G/50ML
2 SOLUTION INTRAVENOUS
Status: COMPLETED | OUTPATIENT
Start: 2025-03-06 | End: 2025-03-06

## 2025-03-06 RX ORDER — ASPIRIN 81 MG/1
81 TABLET ORAL NIGHTLY
Status: DISCONTINUED | OUTPATIENT
Start: 2025-03-06 | End: 2025-03-10 | Stop reason: HOSPADM

## 2025-03-06 RX ORDER — NOREPINEPHRINE BITARTRATE 1 MG/ML
INJECTION, SOLUTION INTRAVENOUS
Status: DISCONTINUED | OUTPATIENT
Start: 2025-03-06 | End: 2025-03-06

## 2025-03-06 RX ORDER — ONDANSETRON HYDROCHLORIDE 2 MG/ML
INJECTION, SOLUTION INTRAVENOUS
Status: DISCONTINUED | OUTPATIENT
Start: 2025-03-06 | End: 2025-03-06

## 2025-03-06 RX ORDER — METOPROLOL TARTRATE 25 MG/1
25 TABLET, FILM COATED ORAL 2 TIMES DAILY
Status: DISCONTINUED | OUTPATIENT
Start: 2025-03-06 | End: 2025-03-10 | Stop reason: HOSPADM

## 2025-03-06 RX ORDER — HYDROMORPHONE HYDROCHLORIDE 2 MG/ML
0.25 INJECTION, SOLUTION INTRAMUSCULAR; INTRAVENOUS; SUBCUTANEOUS EVERY 6 HOURS PRN
Refills: 0 | Status: DISCONTINUED | OUTPATIENT
Start: 2025-03-06 | End: 2025-03-07

## 2025-03-06 RX ORDER — TRAZODONE HYDROCHLORIDE 50 MG/1
50 TABLET ORAL NIGHTLY
Status: DISCONTINUED | OUTPATIENT
Start: 2025-03-06 | End: 2025-03-07

## 2025-03-06 RX ORDER — PHENOBARBITAL 32.4 MG/1
100 TABLET ORAL NIGHTLY
Status: DISCONTINUED | OUTPATIENT
Start: 2025-03-06 | End: 2025-03-10 | Stop reason: HOSPADM

## 2025-03-06 RX ORDER — PROPOFOL 10 MG/ML
INJECTION, EMULSION INTRAVENOUS
Status: DISCONTINUED | OUTPATIENT
Start: 2025-03-06 | End: 2025-03-06

## 2025-03-06 RX ORDER — HYDROMORPHONE HYDROCHLORIDE 2 MG/ML
0.2 INJECTION, SOLUTION INTRAMUSCULAR; INTRAVENOUS; SUBCUTANEOUS EVERY 5 MIN PRN
Status: DISCONTINUED | OUTPATIENT
Start: 2025-03-06 | End: 2025-03-06

## 2025-03-06 RX ORDER — PHENYLEPHRINE HYDROCHLORIDE 10 MG/ML
INJECTION INTRAVENOUS
Status: DISCONTINUED | OUTPATIENT
Start: 2025-03-06 | End: 2025-03-06

## 2025-03-06 RX ORDER — SODIUM CHLORIDE, SODIUM LACTATE, POTASSIUM CHLORIDE, CALCIUM CHLORIDE 600; 310; 30; 20 MG/100ML; MG/100ML; MG/100ML; MG/100ML
INJECTION, SOLUTION INTRAVENOUS CONTINUOUS PRN
Status: DISCONTINUED | OUTPATIENT
Start: 2025-03-06 | End: 2025-03-06

## 2025-03-06 RX ORDER — FENTANYL CITRATE 50 UG/ML
INJECTION, SOLUTION INTRAMUSCULAR; INTRAVENOUS
Status: DISCONTINUED | OUTPATIENT
Start: 2025-03-06 | End: 2025-03-06

## 2025-03-06 RX ORDER — GABAPENTIN 300 MG/1
300 CAPSULE ORAL
Status: COMPLETED | OUTPATIENT
Start: 2025-03-06 | End: 2025-03-06

## 2025-03-06 RX ADMIN — ONDANSETRON 4 MG: 2 INJECTION INTRAMUSCULAR; INTRAVENOUS at 11:03

## 2025-03-06 RX ADMIN — FENTANYL CITRATE 50 MCG: 50 INJECTION, SOLUTION INTRAMUSCULAR; INTRAVENOUS at 11:03

## 2025-03-06 RX ADMIN — PHENYLEPHRINE HYDROCHLORIDE 200 MCG: 10 INJECTION INTRAVENOUS at 11:03

## 2025-03-06 RX ADMIN — NOREPINEPHRINE BITARTRATE 20 MCG: 1 INJECTION, SOLUTION, CONCENTRATE INTRAVENOUS at 12:03

## 2025-03-06 RX ADMIN — SODIUM CHLORIDE, POTASSIUM CHLORIDE, SODIUM LACTATE AND CALCIUM CHLORIDE: 600; 310; 30; 20 INJECTION, SOLUTION INTRAVENOUS at 11:03

## 2025-03-06 RX ADMIN — CEFAZOLIN SODIUM 2 G: 2 SOLUTION INTRAVENOUS at 11:03

## 2025-03-06 RX ADMIN — NOREPINEPHRINE BITARTRATE 10 MCG: 1 INJECTION, SOLUTION, CONCENTRATE INTRAVENOUS at 12:03

## 2025-03-06 RX ADMIN — NOREPINEPHRINE BITARTRATE 10 MCG: 1 INJECTION, SOLUTION, CONCENTRATE INTRAVENOUS at 11:03

## 2025-03-06 RX ADMIN — TRAZODONE HYDROCHLORIDE 50 MG: 50 TABLET ORAL at 09:03

## 2025-03-06 RX ADMIN — ACETAMINOPHEN 1000 MG: 500 TABLET, FILM COATED ORAL at 08:03

## 2025-03-06 RX ADMIN — SODIUM CHLORIDE, POTASSIUM CHLORIDE, SODIUM LACTATE AND CALCIUM CHLORIDE 1000 ML: 600; 310; 30; 20 INJECTION, SOLUTION INTRAVENOUS at 08:03

## 2025-03-06 RX ADMIN — NOREPINEPHRINE BITARTRATE 30 MCG: 1 INJECTION, SOLUTION, CONCENTRATE INTRAVENOUS at 11:03

## 2025-03-06 RX ADMIN — GABAPENTIN 300 MG: 300 CAPSULE ORAL at 08:03

## 2025-03-06 RX ADMIN — PHENOBARBITAL 97.2 MG: 32.4 TABLET ORAL at 09:03

## 2025-03-06 RX ADMIN — TRANEXAMIC ACID 1000 MG: 100 INJECTION INTRAVENOUS at 11:03

## 2025-03-06 RX ADMIN — PHENYLEPHRINE HYDROCHLORIDE 100 MCG: 10 INJECTION INTRAVENOUS at 11:03

## 2025-03-06 RX ADMIN — LIDOCAINE HYDROCHLORIDE 60 MG: 20 INJECTION, SOLUTION EPIDURAL; INFILTRATION; INTRACAUDAL; PERINEURAL at 11:03

## 2025-03-06 RX ADMIN — ASPIRIN 81 MG: 81 TABLET, COATED ORAL at 09:03

## 2025-03-06 RX ADMIN — HYDROMORPHONE HYDROCHLORIDE 0.25 MG: 2 INJECTION INTRAMUSCULAR; INTRAVENOUS; SUBCUTANEOUS at 10:03

## 2025-03-06 RX ADMIN — PROPOFOL 70 MG: 10 INJECTION, EMULSION INTRAVENOUS at 11:03

## 2025-03-06 RX ADMIN — METOPROLOL TARTRATE 25 MG: 25 TABLET, FILM COATED ORAL at 09:03

## 2025-03-06 NOTE — ANESTHESIA PROCEDURE NOTES
Intubation    Date/Time: 3/6/2025 11:21 AM    Performed by: Kayode Edmond CRNA  Authorized by: Gomez Pena DO    Intubation:     Induction:  Intravenous    Mask Ventilation:  N/a    Attempts:  1    Attempted By:  CRNA    Difficult Airway Encountered?: No      Airway Device:  Supraglottic airway/LMA    Airway Device Size:  3.0    Style/Cuff Inflation:  Uncuffed    Placement Verified By:  Capnometry    Complicating Factors:  None    Findings Post-Intubation:  BS equal bilateral and atraumatic/condition of teeth unchanged

## 2025-03-06 NOTE — OP NOTE
[facility] - Periop Services  Operative Note      Date of Procedure: 3/6/2025     Procedure: Procedure(s) (LRB):  BIOPSY, LYMPH NODE (Left axillary node) (Left)   Excisional biopsy of 2 lymph nodes in the left axilla sent for node protocol and histo path  Surgeons and Role:     * Tima Montano MD - Primary    Assisting Surgeon: None    Anesthesia Staff Present in Procedure:   Anesthesiologist: Gomez Pena DO  CRNA: Kayode Edmond CRNA    OR Staff Present in Procedure:  Circulator: Tray Potter RN  Scrub Person: Erika Marroquin STFA; Kaden Vergara ST  Registered Nurse: Smiley Manning RN    Pre-Operative Diagnosis: Localized enlarged lymph nodes [R59.0]    Post-Operative Diagnosis: Post-Op Diagnosis Codes:     * Localized enlarged lymph nodes [R59.0]  BIOPSY, LYMPH NODE (Left axillary node) (Left)   Excisional biopsy of 2 lymph nodes in the left axilla sent for node protocol and histo path  Anesthesia: General    Operative Findings (including complications, if any):  Patient is a 74-year-old  male with a history of hypercholesterolemia hypertension hypothyroidism previous stroke with right-sided weakness anxiety depressive disorder who had axillary and groin lymphadenopathy.  Patient needed a diagnostic specimen for histo path.  Patient was brought to the OR and underwent left axillary node biopsy.  The nodes were sent for node protocol as well as histo path.    Patient had an incision made under prepped and draped sterile conditions general anesthetic of the left axilla.  Two large lymph nodes were removed.  One of the lymph node was cut in 3 sections and sent for node protocol.  The other 1 was sent as histo path.  Patient underwent hemostasis Bovie cautery and hemoclips a Penrose drain was placed in the deep space to allow for drainage.  The deep layer was closed with 3-0 Vicryl skin was closed with 4-0 plain gut suture.  Sterile dressings were applied no problems  were encountered patient tolerated procedure well.        None    Description of Technical Procedures:  Noted above       Significant Surgical Tasks Conducted by the Assistant(s), if Applicable:       Estimated Blood Loss (EBL): 20 mL           Implants: * No implants in log *    Specimens:   Specimen (24h ago, onward)       Start     Ordered    03/06/25 1153  Specimen to Pathology  RELEASE UPON ORDERING        References:    Click here for ordering Quick Tip   Question:  Release to patient  Answer:  Immediate    03/06/25 1153                            Condition: Good    Disposition: PACU - hemodynamically stable.    Attestation: I was present and scrubbed for the entire procedure.        Discharge Note    OUTCOME: Patient tolerated treatment/procedure well without complication and is now ready for discharge.    DISPOSITION: Home or Self Care    FINAL DIAGNOSIS:    Post-Op Diagnosis Codes:     * Localized enlarged lymph nodes [R59.0]     * Localized enlarged lymph nodes [R59.0]  BIOPSY, LYMPH NODE (Left axillary node) (Left)   Excisional biopsy of 2 lymph nodes in the left axilla sent for node protocol and histo path  FOLLOWUP: In clinic 1 week    DISCHARGE INSTRUCTIONS:  No discharge procedures on file.

## 2025-03-06 NOTE — DISCHARGE SUMMARY
[facility]  Discharge Note  Short Stay    Staff present during examination : Ana Rosa Morin RN    Procedure(s) (LRB):  BIOPSY, LYMPH NODE (Left axillary node) (Left)      OUTCOME: Patient tolerated treatment/procedure well without complication and is now ready for discharge.    DISPOSITION: Home or Self Care        FINAL DIAGNOSIS:  <principal problem not specified>     * Localized enlarged lymph nodes [R59.0]  BIOPSY, LYMPH NODE (Left axillary node) (Left)   Excisional biopsy of 2 lymph nodes in the left axilla sent for node protocol and histo path  FOLLOWUP: In clinic 1 week    DISCHARGE INSTRUCTIONS:  No discharge procedures on file.     TIME SPENT ON DISCHARGE:  5 minutes

## 2025-03-06 NOTE — ANESTHESIA POSTPROCEDURE EVALUATION
Anesthesia Post Evaluation    Patient: Rickey Navarro    Procedure(s) Performed: Procedure(s) (LRB):  EXCISIONAL BIOPSY, LYMPH NODE (Left)    Final Anesthesia Type: general      Patient location during evaluation: PACU  Patient participation: Yes- Able to Participate  Level of consciousness: awake and alert and oriented  Post-procedure vital signs: reviewed and stable  Pain management: adequate  Airway patency: patent    PONV status at discharge: No PONV  Anesthetic complications: no      Cardiovascular status: stable  Respiratory status: unassisted, spontaneous ventilation and room air  Hydration status: euvolemic  Follow-up not needed.              Vitals Value Taken Time   /73 03/06/25 12:39   Temp 36.6 °C (97.8 °F) 03/06/25 12:39   Pulse 60 03/06/25 12:40   Resp 18 03/06/25 12:39   SpO2 96 % 03/06/25 12:40         Event Time   Out of Recovery 12:34:45         Pain/Patsy Score: Pain Rating Prior to Med Admin: 0 (3/6/2025  8:44 AM)  Patsy Score: 9 (3/6/2025 12:28 PM)

## 2025-03-06 NOTE — INTERVAL H&P NOTE
The patient has been examined and the H&P has been reviewed:    I concur with the findings and no changes have occurred since H&P was written.    Surgery risks, benefits and alternative options discussed and understood by patient/family.    Staff present during examination Ana Rosa Morin RN        There are no hospital problems to display for this patient.

## 2025-03-06 NOTE — TRANSFER OF CARE
"Anesthesia Transfer of Care Note    Patient: Rickey Navarro    Procedure(s) Performed: Procedure(s) (LRB):  BIOPSY, LYMPH NODE (Left axillary node) (Left)    Patient location: PACU    Anesthesia Type: general    Transport from OR: Transported from OR on room air with adequate spontaneous ventilation    Post pain: adequate analgesia    Post assessment: no apparent anesthetic complications    Post vital signs: stable    Level of consciousness: responds to stimulation    Nausea/Vomiting: no nausea/vomiting    Complications: none    Transfer of care protocol was followed      Last vitals: Visit Vitals  BP (!) 149/73 (BP Location: Left arm, Patient Position: Lying)   Pulse 60   Temp 36.5 °C (97.7 °F) (Oral)   Resp 18   Ht 5' 6" (1.676 m)   Wt 60.3 kg (132 lb 15 oz)   SpO2 100%   BMI 21.46 kg/m²     "

## 2025-03-07 ENCOUNTER — LAB REQUISITION (OUTPATIENT)
Dept: LAB | Facility: HOSPITAL | Age: 75
End: 2025-03-07
Payer: MEDICARE

## 2025-03-07 DIAGNOSIS — R59.9 ENLARGED LYMPH NODES, UNSPECIFIED: ICD-10-CM

## 2025-03-07 LAB
ALBUMIN SERPL-MCNC: 2.8 G/DL (ref 3.4–4.8)
ALBUMIN/GLOB SERPL: 0.8 RATIO (ref 1.1–2)
ALP SERPL-CCNC: 120 UNIT/L (ref 40–150)
ALT SERPL-CCNC: 8 UNIT/L (ref 0–55)
ANION GAP SERPL CALC-SCNC: 8 MEQ/L
AST SERPL-CCNC: 12 UNIT/L (ref 5–34)
BASOPHILS # BLD AUTO: 0.04 X10(3)/MCL
BASOPHILS NFR BLD AUTO: 0.5 %
BILIRUB SERPL-MCNC: 0.3 MG/DL
BUN SERPL-MCNC: 12 MG/DL (ref 8.4–25.7)
CALCIUM SERPL-MCNC: 8.5 MG/DL (ref 8.8–10)
CHLORIDE SERPL-SCNC: 98 MMOL/L (ref 98–107)
CO2 SERPL-SCNC: 25 MMOL/L (ref 23–31)
CREAT SERPL-MCNC: 0.7 MG/DL (ref 0.72–1.25)
CREAT/UREA NIT SERPL: 17
EOSINOPHIL # BLD AUTO: 0.11 X10(3)/MCL (ref 0–0.9)
EOSINOPHIL NFR BLD AUTO: 1.3 %
ERYTHROCYTE [DISTWIDTH] IN BLOOD BY AUTOMATED COUNT: 14 % (ref 11.5–17)
GFR SERPLBLD CREATININE-BSD FMLA CKD-EPI: >60 ML/MIN/1.73/M2
GLOBULIN SER-MCNC: 3.4 GM/DL (ref 2.4–3.5)
GLUCOSE SERPL-MCNC: 97 MG/DL (ref 82–115)
HCT VFR BLD AUTO: 33.3 % (ref 42–52)
HGB BLD-MCNC: 11 G/DL (ref 14–18)
IMM GRANULOCYTES # BLD AUTO: 0.03 X10(3)/MCL (ref 0–0.04)
IMM GRANULOCYTES NFR BLD AUTO: 0.3 %
LYMPHOCYTES # BLD AUTO: 1.25 X10(3)/MCL (ref 0.6–4.6)
LYMPHOCYTES NFR BLD AUTO: 14.3 %
MAGNESIUM SERPL-MCNC: 1.8 MG/DL (ref 1.6–2.6)
MCH RBC QN AUTO: 30 PG (ref 27–31)
MCHC RBC AUTO-ENTMCNC: 33 G/DL (ref 33–36)
MCV RBC AUTO: 90.7 FL (ref 80–94)
MONOCYTES # BLD AUTO: 0.92 X10(3)/MCL (ref 0.1–1.3)
MONOCYTES NFR BLD AUTO: 10.5 %
NEUTROPHILS # BLD AUTO: 6.42 X10(3)/MCL (ref 2.1–9.2)
NEUTROPHILS NFR BLD AUTO: 73.1 %
NRBC BLD AUTO-RTO: 0 %
PHOSPHATE SERPL-MCNC: 3.2 MG/DL (ref 2.3–4.7)
PLATELET # BLD AUTO: 292 X10(3)/MCL (ref 130–400)
PMV BLD AUTO: 11.1 FL (ref 7.4–10.4)
POCT GLUCOSE: 109 MG/DL (ref 70–110)
POTASSIUM SERPL-SCNC: 4.5 MMOL/L (ref 3.5–5.1)
PROT SERPL-MCNC: 6.2 GM/DL (ref 5.8–7.6)
RBC # BLD AUTO: 3.67 X10(6)/MCL (ref 4.7–6.1)
SODIUM SERPL-SCNC: 131 MMOL/L (ref 136–145)
WBC # BLD AUTO: 8.77 X10(3)/MCL (ref 4.5–11.5)

## 2025-03-07 PROCEDURE — 25000003 PHARM REV CODE 250: Performed by: SURGERY

## 2025-03-07 PROCEDURE — 99900035 HC TECH TIME PER 15 MIN (STAT)

## 2025-03-07 PROCEDURE — 97116 GAIT TRAINING THERAPY: CPT

## 2025-03-07 PROCEDURE — 83735 ASSAY OF MAGNESIUM: CPT | Performed by: INTERNAL MEDICINE

## 2025-03-07 PROCEDURE — 97530 THERAPEUTIC ACTIVITIES: CPT

## 2025-03-07 PROCEDURE — 97161 PT EVAL LOW COMPLEX 20 MIN: CPT

## 2025-03-07 PROCEDURE — 36415 COLL VENOUS BLD VENIPUNCTURE: CPT | Performed by: SURGERY

## 2025-03-07 PROCEDURE — 88185 FLOWCYTOMETRY/TC ADD-ON: CPT

## 2025-03-07 PROCEDURE — 84100 ASSAY OF PHOSPHORUS: CPT | Performed by: INTERNAL MEDICINE

## 2025-03-07 PROCEDURE — 88184 FLOWCYTOMETRY/ TC 1 MARKER: CPT | Performed by: PATHOLOGY

## 2025-03-07 PROCEDURE — 85025 COMPLETE CBC W/AUTO DIFF WBC: CPT | Performed by: SURGERY

## 2025-03-07 PROCEDURE — 11000001 HC ACUTE MED/SURG PRIVATE ROOM

## 2025-03-07 PROCEDURE — 25000003 PHARM REV CODE 250: Performed by: INTERNAL MEDICINE

## 2025-03-07 PROCEDURE — 97110 THERAPEUTIC EXERCISES: CPT

## 2025-03-07 PROCEDURE — 80053 COMPREHEN METABOLIC PANEL: CPT | Performed by: SURGERY

## 2025-03-07 PROCEDURE — 94761 N-INVAS EAR/PLS OXIMETRY MLT: CPT

## 2025-03-07 RX ORDER — DIAZEPAM 5 MG/1
10 TABLET ORAL DAILY
Status: DISCONTINUED | OUTPATIENT
Start: 2025-03-08 | End: 2025-03-10

## 2025-03-07 RX ORDER — ACETAMINOPHEN 325 MG/1
650 TABLET ORAL EVERY 6 HOURS PRN
Status: DISCONTINUED | OUTPATIENT
Start: 2025-03-07 | End: 2025-03-10 | Stop reason: HOSPADM

## 2025-03-07 RX ORDER — TRAZODONE HYDROCHLORIDE 50 MG/1
50 TABLET ORAL NIGHTLY
Status: DISCONTINUED | OUTPATIENT
Start: 2025-03-07 | End: 2025-03-10 | Stop reason: HOSPADM

## 2025-03-07 RX ORDER — ZOLPIDEM TARTRATE 5 MG/1
5 TABLET ORAL NIGHTLY
Status: DISCONTINUED | OUTPATIENT
Start: 2025-03-07 | End: 2025-03-10 | Stop reason: HOSPADM

## 2025-03-07 RX ORDER — TRIPROLIDINE/PSEUDOEPHEDRINE 2.5MG-60MG
400 TABLET ORAL EVERY 6 HOURS PRN
Status: DISCONTINUED | OUTPATIENT
Start: 2025-03-07 | End: 2025-03-10 | Stop reason: HOSPADM

## 2025-03-07 RX ADMIN — ACETAMINOPHEN 650 MG: 325 TABLET, FILM COATED ORAL at 04:03

## 2025-03-07 RX ADMIN — METOPROLOL TARTRATE 25 MG: 25 TABLET, FILM COATED ORAL at 08:03

## 2025-03-07 RX ADMIN — METOPROLOL TARTRATE 25 MG: 25 TABLET, FILM COATED ORAL at 09:03

## 2025-03-07 RX ADMIN — LISINOPRIL 10 MG: 10 TABLET ORAL at 06:03

## 2025-03-07 RX ADMIN — LEVOTHYROXINE SODIUM 150 MCG: 0.07 TABLET ORAL at 06:03

## 2025-03-07 RX ADMIN — PHENOBARBITAL 97.2 MG: 32.4 TABLET ORAL at 08:03

## 2025-03-07 RX ADMIN — ACETAMINOPHEN 650 MG: 325 TABLET, FILM COATED ORAL at 08:03

## 2025-03-07 RX ADMIN — RIVAROXABAN 20 MG: 10 TABLET, FILM COATED ORAL at 06:03

## 2025-03-07 RX ADMIN — ZOLPIDEM TARTRATE 5 MG: 5 TABLET ORAL at 08:03

## 2025-03-07 RX ADMIN — TRAZODONE HYDROCHLORIDE 50 MG: 50 TABLET ORAL at 08:03

## 2025-03-07 RX ADMIN — ASPIRIN 81 MG: 81 TABLET, COATED ORAL at 08:03

## 2025-03-07 NOTE — CONSULTS
[facility]                     [address] [address]    PATIENT NAME:       RICKEY SHERIFF   YOB: 1950  CSN:                [id number]   MRN:                [id number]  ADMIT DATE:         03/06/2025 08:03:00  PHYSICIAN:          Maxime Galindo MD                            CONSULTATION    DATE OF CONSULT:      REASON FOR CONSULTATION:  Medical management of the patient, who was admitted   for removal of lymph node from his left axilla.    HISTORY OF PRESENT ILLNESS:  Mr. Rickey Sheriff is a very pleasant 74-year-old   gentleman, who has been having increasing swelling of bilateral hands, more so   on the left than right and he came to me in the clinic and he was noted to have   axillary lymphadenopathy.  He had minimal inguinal lymphadenopathy that time,   but 2 weeks later he also had shotty nontender lymph nodes in his groin.  I   immediately called Dr. Tima Montano's office as this needed definitely to   be biopsied.  He was admitted today under Dr. Tima Montano's care and he   underwent excisional biopsy of his left axillary lymphadenopathy uneventfully.    Postoperatively, the patient is seen and he is alert and oriented x3.  He is in   no distress.  There is no bleeding from the operative site and the patient's   Xarelto after clearance from Dr. Tima Montano has been started.  Besides   that, there were no other system-specific complaint.    PAST MEDICAL HISTORY:  Significant for hypertension, pulmonary hypertension,   hypothyroidism, atrial fibrillation, BPH with LUTS, hyponatremia, CVA, seizure   disorder, tobacco use, GERD, insomnia, diastolic heart failure, pulmonary   stenosis at the age of 6, status post open-heart surgery with vascular closure   device, pacemaker placed in 2022 by rylee Pena.    PAST SURGICAL HISTORY:  Includes hernia repair, bilateral inguinal hernia  repair   in 1988, pacemaker placement in 2022, vascular closure device in 2021, brain   surgery x2 due to abscess in 1987, EGD more than 20 years ago.    SOCIAL HISTORY:  Lives alone.  Used to smoke in the past.  Does not smoke   anymore.    FAMILY HISTORY:  Pertinent for heart disease in mother and father and cancer in   brother.    ALLERGIES:  NO KNOWN DRUG ALLERGIES.     MEDICATIONS:  On which the patient is on are as follows:  1. Diazepam 10 mg daily.  2. Diclofenac potassium 50 mg as needed.  3. Levothyroxine 150 mcg daily.  4. Lisinopril 20 mg daily.  5. Methocarbamol 500 mg b.i.d. as needed.  6. Metoprolol tartrate 25 mg daily.  7. Omeprazole 20 mg daily.  8. Phenobarbital 100 mg daily.  9. Trazodone 50 mg daily.  10. Xarelto 20 mg daily.  11. Zolpidem 10 mg daily.    REVIEW OF SYSTEMS:  As mentioned in the History of Present Illness.    PHYSICAL EXAMINATION:  GENERAL:  The patient is alert and oriented x3.  He is in no acute distress.  VITAL SIGNS:  As follows; blood pressure 144/85, temperature 97.7, O2 sat 94%,   63 pulse.  HEENT:  Head is normocephalic.  Pupils bilaterally reactive and equal in size.    Mild conjunctival pallor.  No scleral icterus.  Trachea is midline.  CHEST:  Has good bilateral air entry.  CVS:  First and second heart sounds are heard normally without any murmurs.   ABDOMEN:  Soft, nontender.  EXTREMITIES:  Devoid of edema, cyanosis, or clubbing.  Left axilla status post   excision of the lymph node.    LABS AND INVESTIGATIONS:  As follows; WBC 12.03, hemoglobin 12.1, hematocrit   36.6, platelet count adequate.  Sodium 129, potassium 4.0, chloride 26, GFR of   more than 60, glucose 254.    IMPRESSION:    1. Status post excisional biopsy of the left axillary lymph node.  2. Hyponatremia.  3. History of hypertension.  4. History of seizures.  5. History of gastroesophageal reflux disease.  6. History of primary hypothyroidism.  7. History of insomnia.  8. History of diastolic dysfunction  status post permanent pacemaker placement.    PLAN:  To follow the patient along with Dr. Tima Montano.  Resume his home   medications.  Resume the Xarelto.  DVT prophylaxis is not needed as the patient   is on Xarelto.  GI prophylaxis with proton pump inhibitors.  Follow with the   levels of sodium in the morning.  Gentle hydration if needed tomorrow.  Follow   the results of the biopsy as an outpatient.  I thank Dr. Tima Montano.    Pleasure taking care of . Rickey Navarro along with Dr. Tima Montano during   his stay at [facility] on the 3rd floor.        ______________________________  MD RICHARD Ambrose/LLOYD  DD:  03/06/2025  Time:  08:14PM  DT:  03/07/2025  Time:  12:11AM  Job #:  [id number]      CONSULTATION

## 2025-03-07 NOTE — PLAN OF CARE
Spoke to daughter (Abby Burnett) regarding downgrade to OBS. Pending MOON delivery for signature.

## 2025-03-07 NOTE — PLAN OF CARE
Dr. Galindo made rounds.  He spoke to me and stated pt cannot go home, he is too weak and debilitated and needs SNF.  He said to let CAITLIN Cha know this.  Messsaged Starla on epic chat.  Patient was originally admitted as IP. He needs to be IP to be able to qualify for skilled and he needs 3 IP midnights.  Patient lives alone and is unsafe to d/c home.  Met with patient and fly.  Patient wants to got to skilled at [facility].  Referral sent.  Placed IP order.  Junior, [facility], sending for 142.

## 2025-03-07 NOTE — PLAN OF CARE
New referral sent over to [facility].  [facility] was called in and fax and 142 pending

## 2025-03-07 NOTE — CONSULTS
"3/7/2025  Rickey Navarro   1950   [id number]       Initial Psychiatric Consult    Chief complaint: "I'm doing okay ma'am."      SUBJECTIVE:   HPI:   Rickey Navarro is a 74 y.o. male with past psychiatric history of insomnia, currently admitted with Enlarged lymph nodes.  Psychiatry has been consulted to address depressive symptoms. His home medications regimen include Trazodone 50mg orally at bedtime, Ambien 10mg orally daily and Valium 10mg orally daily. Both Ambien and Valium was discontinued on 03/05/2025 due to unknown reasons.  Pending orders noted to decrease trazodone to 25 mg orally at bedtime.    At the time of the assessment, the patient is lying in bed and shows no signs of acute distress. His niece and nurse are at his bedside. The assessment is conducted via telehealth. The patient is alert, awake, and oriented to person, place, time, and situation. He reports that recent external stressors have led to changes in his mental state. He expresses feelings of sadness, loneliness, worrisome (regarding near future and current medical status), and a lack of motivation. Currently, he is residing with a friend who does not allow visitors, which has contributed to his self-isolation. The patient elaborates on his most recent hospital stay, mentioning that he does not like that he has to depend on others. He states that he is typically independent and does not like being reliant on others. Both the patient and his nurse, along with the family member, agree that his spirits have improved since having family members at his bedside today. He reports an appropriate appetite and states that he is able to fall asleep and remain asleep throughout the night. He denies experiencing any nightmares, night terrors, vivid dreams, or lucid dreams. When asked about thoughts of death, suicidal ideations, homicidal ideations, or self-injurious behaviors, he responds with an emphatic "absolutely no." He also denies having a history " of bipolar disorder, psychosis, obsessive-compulsive disorder (OCD), trauma, or post-traumatic stress disorder (PTSD).  The patient niece states that his current home is being remodeled.  There are plans in place for the patient to move in with her pending his discharge.      Collateral:   Nurse, medical records, niece.    Past Psychiatric History:   Previous Psychiatric Hospitalizations:  Denies   Previous Medication Trials:  Ambien, Valium, trazodone  Previous Suicide Attempts:  Denies   History of Violence:  Denies   Outpatient psychiatrist: Primary care provider     Current Medications:   Home Meds:  Ambien 10 mg, Valium 10 mg, trazodone 50 mg    Past Medical/Surgical History:   Past Medical History:   Diagnosis Date    Coronary artery disease     Dyspnea on exertion     GERD (gastroesophageal reflux disease)     Heart murmur     Hyperlipidemia     Hypertension     Pacemaker     Pulmonary valve stenosis     Seizures     Sleep apnea     No CPAP     Past Surgical History:   Procedure Laterality Date    INSERTION OF PACEMAKER      Open Heart Surgery       REPAIR, VENTRICULAR SEPTAL DEFECT, WITH PULMONARY ARTERY BAND REMOVAL      RIGHT HEART CATHETERIZATION Right 10/08/2021    Procedure: INSERTION, CATHETER, RIGHT HEART;  Surgeon: Panda Zuniga MD;  Location: [facility] CATH LAB;  Service: Cardiology;  Laterality: Right;    TRANSESOPHAGEAL ECHOCARDIOGRAPHY N/A 09/28/2021    Procedure: ECHOCARDIOGRAM, TRANSESOPHAGEAL;  Surgeon: Amanda Diagnostic Provider;  Location: [facility] EP LAB;  Service: Cardiology;  Laterality: N/A;        Scheduled Meds:    aspirin  81 mg Oral QHS    furosemide  20 mg Intravenous Once    levothyroxine  150 mcg Oral Before breakfast    lisinopriL  10 mg Oral QAM    metoprolol tartrate  25 mg Oral BID    PHENobarbitaL  97.2 mg Oral QHS    rivaroxaban  20 mg Oral QAM    traZODone  25 mg Oral QHS      PRN Meds:   Current Facility-Administered Medications:     acetaminophen, 650 mg, Oral, Q6H PRN     ibuprofen, 400 mg, Oral, Q6H PRN   Psychotherapeutics (From admission, onward)      Start     Stop Route Frequency Ordered    03/07/25 2100  trazodone split tablet 25 mg         -- Oral Nightly 03/07/25 0441    03/06/25 2115  PHENobarbitaL tablet 97.2 mg         -- Oral Nightly 03/06/25 2004            Allergies:   Review of patient's allergies indicates:   Allergen Reactions    Codeine Other (See Comments) and Nausea And Vomiting          Substance Abuse History:   Tobacco Use:Denies    Use of Alcohol: Denies    Recreational Drugs:Denies    Rehab History:Denies        Nerurological History:   Seizures: 3  months ago he had a seizure    Head trauma: Denies     Social History:  Housing Status: Reside with a friend but he's not allowed to visitors. Has a home that is being remodeled. Plans to move with his niece.   Relationship Status/Sexual Orientation: Heterosexual/    Children: One child   Education: High school    Employment Status/Info: Disabled    history: Denies   History of physical/sexual abuse: Denies    Access to gun: Shot gun (at his home) locked in a safe and knife        Legal History:   Past Charges/Incarcerations:Denies    Pending charges: Denies       OBJECTIVE:   Vitals   Vitals:    03/07/25 0815   BP: (!) 145/76   Pulse: 71   Resp: 18   Temp: 99.4 °F (37.4 °C)        Labs/Imaging/Studies:   Recent Results (from the past 48 hours)   Protime-INR    Collection Time: 03/06/25  8:18 AM   Result Value Ref Range    PT 14.2 12.4 - 14.9 seconds    INR 1.1 <=1.3   APTT    Collection Time: 03/06/25  8:18 AM   Result Value Ref Range    PTT 41.2 (H) 24.2 - 35.9 seconds   POCT glucose    Collection Time: 03/06/25 12:22 PM   Result Value Ref Range    POCT Glucose 109 70 - 110 mg/dL   Comprehensive Metabolic Panel    Collection Time: 03/07/25  4:55 AM   Result Value Ref Range    Sodium 131 (L) 136 - 145 mmol/L    Potassium 4.5 3.5 - 5.1 mmol/L    Chloride 98 98 - 107 mmol/L    CO2 25 23 - 31 mmol/L  "   Glucose 97 82 - 115 mg/dL    Blood Urea Nitrogen 12.0 8.4 - 25.7 mg/dL    Creatinine 0.70 (L) 0.72 - 1.25 mg/dL    Calcium 8.5 (L) 8.8 - 10.0 mg/dL    Protein Total 6.2 5.8 - 7.6 gm/dL    Albumin 2.8 (L) 3.4 - 4.8 g/dL    Globulin 3.4 2.4 - 3.5 gm/dL    Albumin/Globulin Ratio 0.8 (L) 1.1 - 2.0 ratio    Bilirubin Total 0.3 <=1.5 mg/dL     40 - 150 unit/L    ALT 8 0 - 55 unit/L    AST 12 5 - 34 unit/L    eGFR >60 mL/min/1.73/m2    Anion Gap 8.0 mEq/L    BUN/Creatinine Ratio 17    Magnesium    Collection Time: 03/07/25  4:55 AM   Result Value Ref Range    Magnesium Level 1.80 1.60 - 2.60 mg/dL   Phosphorus    Collection Time: 03/07/25  4:55 AM   Result Value Ref Range    Phosphorus Level 3.2 2.3 - 4.7 mg/dL   CBC with Differential    Collection Time: 03/07/25  4:55 AM   Result Value Ref Range    WBC 8.77 4.50 - 11.50 x10(3)/mcL    RBC 3.67 (L) 4.70 - 6.10 x10(6)/mcL    Hgb 11.0 (L) 14.0 - 18.0 g/dL    Hct 33.3 (L) 42.0 - 52.0 %    MCV 90.7 80.0 - 94.0 fL    MCH 30.0 27.0 - 31.0 pg    MCHC 33.0 33.0 - 36.0 g/dL    RDW 14.0 11.5 - 17.0 %    Platelet 292 130 - 400 x10(3)/mcL    MPV 11.1 (H) 7.4 - 10.4 fL    Neut % 73.1 %    Lymph % 14.3 %    Mono % 10.5 %    Eos % 1.3 %    Basophil % 0.5 %    Imm Grans % 0.3 %    Neut # 6.42 2.1 - 9.2 x10(3)/mcL    Lymph # 1.25 0.6 - 4.6 x10(3)/mcL    Mono # 0.92 0.1 - 1.3 x10(3)/mcL    Eos # 0.11 0 - 0.9 x10(3)/mcL    Baso # 0.04 <=0.2 x10(3)/mcL    Imm Gran # 0.03 0.00 - 0.04 x10(3)/mcL    NRBC% 0.0 %      No results found for: "PHENYTOIN", "PHENOBARB", "VALPROATE", "CBMZ"      Psychiatric Mental Status Exam:  General Appearance: appears stated age, well developed and nourished, adequately groomed and appropriately dressed, in no acute distress  Arousal: alert  Behavior: normal, cooperative, friendly, pleasant, polite, appropriate eye-contact, under good behavioral control  Movements and Motor Activity: no tics, no tremors, no akathisia, no dystonia, no evidence of tardive " dyskinesia; no psychomotor agitation or retardation  Orientation: grossly intact, oriented to person, place, time, and situation  Speech: normal rate, rhythm, volume, tone and pitch  Mood: Near euthymic  Affect: appropriate to situation and context  Thought Process: linear, logical  Associations: no loosening of associations  Thought Content and Perceptions: no suicidal or homicidal ideation, no auditory or visual hallucinations, no paranoid ideation, no ideas of reference, no evidence of delusions or psychosis  Recent and Remote Memory: grossly intact; per interview/observation with patient  Attention and Concentration: grossly intact; per interview/observation with patient  Fund of Knowledge: grossly intact; based on history, vocabulary, fund of knowledge, syntax, grammar, and content  Insight: adequate, age appropriate; based on understanding of severity of illness and HPI  Judgment: adequate, age appropriate; based on patient's behavior and HPI    ASSESSMENT/PLAN:   F43. 23 Adjustment Disorder with Mixed Anxiety & Depressed Mood  F51.05 Insomnia     Past Medical History:   Diagnosis Date    Coronary artery disease     Dyspnea on exertion     GERD (gastroesophageal reflux disease)     Heart murmur     Hyperlipidemia     Hypertension     Pacemaker     Pulmonary valve stenosis     Seizures     Sleep apnea     No CPAP          Recommendations:   Medication management (Discussed with nurse, patient, and patient's niece. Patient states she will contact attending provider)   Resume Trazodone 50mg orally at bedtime  Educated patient on beginning Lexapro 5mg orally daily   Denied need at this present time   PEC/CEC  Not needed at this present time. He is not a threat to himself or others at this present time  Follow up  Once medically cleared for discharge, follow up with attending provider   Psych  Will sign off. Reconsult if needed       DAVID CHEN, FELIPEP-BC

## 2025-03-07 NOTE — PROGRESS NOTES
[facility]                     [address] [facility] [address]    PATIENT NAME:       MICHAEL SHERIFF   YOB: 1950  CSN:                [id number]   MRN:                [id number]  ADMIT DATE:         03/06/2025 08:03:00  PHYSICIAN:          Maxime Galindo MD                            PROGRESS NOTE    DATE:      CODE STATUS:  Full code.    SUBJECTIVE:  Patient was visited in the room and the patient did tell me that he   is feeling too weak to walk.  As a result, I did talk to the Case Management   and he lives alone at home and it is extreme danger to fall and sustain any kind   of a fracture or head trauma, so definitely patient cannot be discharged.  He   also has extreme debility and deconditioning.  I am ordering a physical therapy   consult.  The patient also expressed that he feels hopeless and he is already on   antidepressants and I placed a Psych consult and we will follow with the Psych   recommendations.  The patient most likely will require to be inpatient so that   he can be placed in a skilled unit.  The patient was admitted for excision of   the lymph node in the left axillary region, which underwent uneventfully with   Dr. Tima Montano's care.  He did have some hyponatremia, which is getting   better.  His sodium today was 131.    OBJECTIVE:  VITAL SIGNS:  His vitals are as follows; 140/76 blood pressure, 99.4   temperature, 100% O2 sat, 71 pulse.  HEENT:  Head is normocephalic.  Pupils bilaterally reactive and equal in size.    Mild conjunctival pallor.  No scleral icterus.  Trachea is midline.  CHEST:  Has good bilateral air entry.  CVS:  First and second heart sounds are heard normally without any murmurs.  ABDOMEN:  Soft, nontender.   EXTREMITIES:  Devoid of any edema, cyanosis or clubbing.    LABS AND INVESTIGATIONS:  As follows; WBC 8.77, hemoglobin 11.0, hematocrit   33.3, platelet count  adequate.  Sodium 131, potassium 4.5, chloride 98, bicarb   25, BUN 12, creatinine 0.70.  GFR more than 60.    IMPRESSION:    1. Status post excisional biopsy of the left axillary lymph node.  2. Extreme debility and deconditioning, unable to walk.  Physical therapy   consult ordered.  3. Expression feeling of hopelessness and increasing depression.  Psych consult   placed.  4. History of insomnia.  5. History of gastroesophageal reflux disease.  6. History of primary hypothyroidism.  7. History of hypertension.    PLAN:  Continue the present line of treatment.  Follow the surgical   recommendations.  Psych consult was placed.  Consult physical therapy.  Follow   with the levels of the sodium in the morning.    Pleasure taking care of Mr. Rickey Navarro during his stay at [facility] on the 3rd floor.        ______________________________  Maxime Galindo MD    SS/AQS  DD:  03/07/2025  Time:  02:28PM  DT:  03/07/2025  Time:  03:02PM  Job #:  [id number]      PROGRESS NOTE

## 2025-03-07 NOTE — CODE 44
"MEDICARE CONDITION 44     (Reference: Transmittal 200 of the Medicare Manual)     A Medicare "Inpatient Admission" may be changed to an "Outpatient" (includes  Outpatient Observation) status, if the following conditions exist:  The change in patient status from inpatient to outpatient is made prior to        discharge or release, while the patient is still a patient in the hospital.   The hospital has not submitted a claim for the inpatient admission.  A physician concurs with the Utilization Committee decision.   Physician concurrence with the Utilization Committee's decision is documented         in the patient's records.      The entire case has been reviewed with Tima Montano MD, attending physician and me, physician advisor/member of the Utilization Management Committee. We are both in agreement that this should be an Outpatient/Observation encounter because the patient did not meet medical necessity for inpatient admission. The patient and/or patient representative have been notified of the change in billing status.           Mario Monk MD  Utilization Management  Physician Advisor  [facility]      "

## 2025-03-07 NOTE — PROGRESS NOTES
Date of Procedure: 3/6/2025      Procedure: Procedure(s) (LRB):  BIOPSY, LYMPH NODE (Left axillary node) (Left)   Excisional biopsy of 2 lymph nodes in the left axilla sent for node protocol and histo path  Surgeons and Role:     * Tima Montano MD - Primary     Assisting Surgeon: None     Anesthesia Staff Present in Procedure:   Anesthesiologist: Gomez Pena DO  CRNA: Kayode Edmond, AMRITA     OR Staff Present in Procedure:  Circulator: Tray Potter RN  Scrub Person: Erika Marroquin STFA; Kaden Vergara ST  Registered Nurse: Smiley Manning RN     Pre-Operative Diagnosis: Localized enlarged lymph nodes [R59.0]     Post-Operative Diagnosis: Post-Op Diagnosis Codes:     * Localized enlarged lymph nodes [R59.0]  BIOPSY, LYMPH NODE (Left axillary node) (Left)   Excisional biopsy of 2 lymph nodes in the left axilla sent for node protocol and histo path  Anesthesia: General    03/06/2025   Staff present during examination : Tawanna Suarez RN  Vital signs stable afebrile  Patient was unable to go home because he is unable to take care of himself   He needs nursing home placement   He has requested this and as a result I admitted the patient for assistance in nursing home placement   Patient lives with a roommate who does not desire to have home health come to their house  His daughter is also present in the room and can not take care of him from where she is in [place]  Patient has a result was admitted to the hospital primarily for social reasons.  I will need case management to get him to nursing home as quickly as possible.

## 2025-03-08 PROBLEM — R53.81 DEBILITY: Status: ACTIVE | Noted: 2025-03-08

## 2025-03-08 PROBLEM — F33.1 MODERATE EPISODE OF RECURRENT MAJOR DEPRESSIVE DISORDER: Status: ACTIVE | Noted: 2025-03-08

## 2025-03-08 LAB
ANION GAP SERPL CALC-SCNC: 7 MEQ/L
BASOPHILS # BLD AUTO: 0.05 X10(3)/MCL
BASOPHILS NFR BLD AUTO: 0.6 %
BUN SERPL-MCNC: 14 MG/DL (ref 8.4–25.7)
CALCIUM SERPL-MCNC: 8.8 MG/DL (ref 8.8–10)
CHLORIDE SERPL-SCNC: 99 MMOL/L (ref 98–107)
CO2 SERPL-SCNC: 25 MMOL/L (ref 23–31)
CREAT SERPL-MCNC: 0.68 MG/DL (ref 0.72–1.25)
CREAT/UREA NIT SERPL: 21
EOSINOPHIL # BLD AUTO: 0.1 X10(3)/MCL (ref 0–0.9)
EOSINOPHIL NFR BLD AUTO: 1.2 %
ERYTHROCYTE [DISTWIDTH] IN BLOOD BY AUTOMATED COUNT: 14 % (ref 11.5–17)
GFR SERPLBLD CREATININE-BSD FMLA CKD-EPI: >60 ML/MIN/1.73/M2
GLUCOSE SERPL-MCNC: 106 MG/DL (ref 82–115)
HCT VFR BLD AUTO: 32.6 % (ref 42–52)
HGB BLD-MCNC: 10.9 G/DL (ref 14–18)
IMM GRANULOCYTES # BLD AUTO: 0.02 X10(3)/MCL (ref 0–0.04)
IMM GRANULOCYTES NFR BLD AUTO: 0.2 %
LYMPHOCYTES # BLD AUTO: 1.01 X10(3)/MCL (ref 0.6–4.6)
LYMPHOCYTES NFR BLD AUTO: 12.4 %
MAGNESIUM SERPL-MCNC: 2 MG/DL (ref 1.6–2.6)
MCH RBC QN AUTO: 30.4 PG (ref 27–31)
MCHC RBC AUTO-ENTMCNC: 33.4 G/DL (ref 33–36)
MCV RBC AUTO: 90.8 FL (ref 80–94)
MONOCYTES # BLD AUTO: 0.92 X10(3)/MCL (ref 0.1–1.3)
MONOCYTES NFR BLD AUTO: 11.3 %
NEUTROPHILS # BLD AUTO: 6.05 X10(3)/MCL (ref 2.1–9.2)
NEUTROPHILS NFR BLD AUTO: 74.3 %
NRBC BLD AUTO-RTO: 0 %
PHOSPHATE SERPL-MCNC: 3.6 MG/DL (ref 2.3–4.7)
PLATELET # BLD AUTO: 274 X10(3)/MCL (ref 130–400)
PMV BLD AUTO: 10.6 FL (ref 7.4–10.4)
POTASSIUM SERPL-SCNC: 4 MMOL/L (ref 3.5–5.1)
RBC # BLD AUTO: 3.59 X10(6)/MCL (ref 4.7–6.1)
SODIUM SERPL-SCNC: 131 MMOL/L (ref 136–145)
WBC # BLD AUTO: 8.15 X10(3)/MCL (ref 4.5–11.5)

## 2025-03-08 PROCEDURE — 85025 COMPLETE CBC W/AUTO DIFF WBC: CPT | Performed by: INTERNAL MEDICINE

## 2025-03-08 PROCEDURE — 80048 BASIC METABOLIC PNL TOTAL CA: CPT | Performed by: INTERNAL MEDICINE

## 2025-03-08 PROCEDURE — 11000001 HC ACUTE MED/SURG PRIVATE ROOM

## 2025-03-08 PROCEDURE — 84100 ASSAY OF PHOSPHORUS: CPT | Performed by: INTERNAL MEDICINE

## 2025-03-08 PROCEDURE — 94761 N-INVAS EAR/PLS OXIMETRY MLT: CPT

## 2025-03-08 PROCEDURE — 36415 COLL VENOUS BLD VENIPUNCTURE: CPT | Performed by: INTERNAL MEDICINE

## 2025-03-08 PROCEDURE — 83735 ASSAY OF MAGNESIUM: CPT | Performed by: INTERNAL MEDICINE

## 2025-03-08 PROCEDURE — 25000003 PHARM REV CODE 250: Performed by: SURGERY

## 2025-03-08 PROCEDURE — 25000003 PHARM REV CODE 250: Performed by: INTERNAL MEDICINE

## 2025-03-08 RX ADMIN — IBUPROFEN 400 MG: 100 SUSPENSION ORAL at 08:03

## 2025-03-08 RX ADMIN — TRAZODONE HYDROCHLORIDE 50 MG: 50 TABLET ORAL at 08:03

## 2025-03-08 RX ADMIN — ASPIRIN 81 MG: 81 TABLET, COATED ORAL at 08:03

## 2025-03-08 RX ADMIN — LEVOTHYROXINE SODIUM 150 MCG: 0.07 TABLET ORAL at 06:03

## 2025-03-08 RX ADMIN — METOPROLOL TARTRATE 25 MG: 25 TABLET, FILM COATED ORAL at 08:03

## 2025-03-08 RX ADMIN — IBUPROFEN 400 MG: 100 SUSPENSION ORAL at 09:03

## 2025-03-08 RX ADMIN — PHENOBARBITAL 97.2 MG: 32.4 TABLET ORAL at 08:03

## 2025-03-08 RX ADMIN — LISINOPRIL 10 MG: 10 TABLET ORAL at 06:03

## 2025-03-08 RX ADMIN — RIVAROXABAN 20 MG: 10 TABLET, FILM COATED ORAL at 06:03

## 2025-03-08 RX ADMIN — ZOLPIDEM TARTRATE 5 MG: 5 TABLET ORAL at 08:03

## 2025-03-08 NOTE — PROGRESS NOTES
[facility] Medicine  Progress Note    Patient Name: Rickey Navarro  MRN: [id number]  Patient Class: IP- Inpatient   Admission Date: 3/6/2025  Length of Stay: 2 days  Attending Physician: Tima Montano MD  Primary Care Provider: Maxime Galindo MD        Subjective     Principal Problem:Enlarged lymph nodes        HPI:  No notes on file    Overview/Hospital Course:  In better spirits today.      Did not sleep well last night.      Says pain is well-controlled no shortness a breath no nausea vomiting.      Still with weakness is tolerating physical well    Interval History:  1 day    Review of Systems   Constitutional:  Positive for activity change and fatigue.   HENT: Negative.     Eyes: Negative.    Respiratory: Negative.     Cardiovascular: Negative.    Gastrointestinal: Negative.    Endocrine: Negative.    Genitourinary: Negative.    Musculoskeletal:  Positive for arthralgias.   Skin: Negative.    Neurological:  Positive for weakness.   Hematological:  Positive for adenopathy.   Psychiatric/Behavioral: Negative.  Negative for confusion and decreased concentration.      Objective:     Vital Signs (Most Recent):  Temp: 98.1 °F (36.7 °C) (03/08/25 1040)  Pulse: 66 (03/08/25 1040)  Resp: 20 (03/08/25 1040)  BP: 136/77 (03/08/25 1040)  SpO2: (!) 93 % (03/08/25 1040) Vital Signs (24h Range):  Temp:  [98.1 °F (36.7 °C)-99 °F (37.2 °C)] 98.1 °F (36.7 °C)  Pulse:  [59-73] 66  Resp:  [18-20] 20  SpO2:  [92 %-95 %] 93 %  BP: (117-141)/(62-77) 136/77     Weight: 60.3 kg (132 lb 15 oz)  Body mass index is 21.46 kg/m².    Intake/Output Summary (Last 24 hours) at 3/8/2025 1209  Last data filed at 3/8/2025 0907  Gross per 24 hour   Intake 420 ml   Output 425 ml   Net -5 ml         Physical Exam          Significant Labs: All pertinent labs within the past 24 hours have been reviewed.  BMP:   Recent Labs   Lab 03/08/25  0331   *   K 4.0   CL 99   CO2 25   BUN 14.0   CREATININE  0.68*   CALCIUM 8.8   MG 2.00     CBC:   Recent Labs   Lab 03/07/25  0455 03/08/25  0331   WBC 8.77 8.15   HGB 11.0* 10.9*   HCT 33.3* 32.6*    274       Significant Imaging: I have reviewed all pertinent imaging results/findings within the past 24 hours.      Assessment & Plan  Enlarged lymph nodes  Status post biopsy results pending    Hypertension  Patient's blood pressure range in the last 24 hours was: BP  Min: 117/67  Max: 141/77.The patient's inpatient anti-hypertensive regimen is listed below:  Current Antihypertensives  , 2 times daily, Oral  furosemide injection 20 mg, Once, Intravenous  lisinopriL tablet 10 mg, Every morning, Oral  metoprolol tartrate (LOPRESSOR) tablet 25 mg, 2 times daily, Oral    Plan  - BP is controlled, no changes needed to their regimen  - ent meds  Debility  Patient with Acute on chronic debility due to other reduced mobility. The patient's latest AMPAC (Activity Measure for Post Acute Care) Score is listed below.    AM-PAC Score - How much help does the patient need for each activity listed  Basic Mobility Total Score: 11  Turning over in bed (including adjusting bedclothes, sheets and blankets)?: A lot  Sitting down on and standing up from a chair with arms (e.g., wheelchair, bedside commode, etc.): A lot  Moving from lying on back to sitting on the side of the bed?: A lot  Moving to and from a bed to a chair (including a wheelchair)?: A lot  Need to walk in hospital room?: A lot  Climbing 3-5 steps with a railing?: Unable    Plan  - Progressive mobility protocol initated  - we will put in consult for skilled nursing for prolonged physical therapy        Moderate episode of recurrent major depressive disorder  Psych consult has placed on medication for sleep    VTE Risk Mitigation (From admission, onward)           Ordered     rivaroxaban tablet 20 mg  Every morning         03/06/25 2004     IP VTE HIGH RISK PATIENT  Once         03/06/25 0805     Place sequential  compression device  Until discontinued         03/06/25 0805                    Discharge Planning   DINAH: 3/8/2025     Code Status: Prior   Medical Readiness for Discharge Date: 3/6/2025                           Supa Kaur MD  Department of Hospital Medicine   [facility]

## 2025-03-08 NOTE — PROGRESS NOTES
Date of Procedure: 3/6/2025      Procedure: Procedure(s) (LRB):  BIOPSY, LYMPH NODE (Left axillary node) (Left)   Excisional biopsy of 2 lymph nodes in the left axilla sent for node protocol and histo path  Surgeons and Role:     * Tima Montano MD - Primary     Assisting Surgeon: None     Anesthesia Staff Present in Procedure:   Anesthesiologist: Gomez Pena DO  CRNA: Kayode Edmond, AMRITA     OR Staff Present in Procedure:  Circulator: Tray Potter RN  Scrub Person: Erika Marroquin STFA; Kaden Vergara ST  Registered Nurse: Smiley Manning RN     Pre-Operative Diagnosis: Localized enlarged lymph nodes [R59.0]     Post-Operative Diagnosis: Post-Op Diagnosis Codes:     * Localized enlarged lymph nodes [R59.0]  BIOPSY, LYMPH NODE (Left axillary node) (Left)   Excisional biopsy of 2 lymph nodes in the left axilla sent for node protocol and histo path  Anesthesia: General    03/06/2025   Staff present during examination : Tawanna Suarez RN  Vital signs stable afebrile  Patient was unable to go home because he is unable to take care of himself   He needs nursing home placement   He has requested this and as a result I admitted the patient for assistance in nursing home placement   Patient lives with a roommate who does not desire to have home health come to their house  His daughter is also present in the room and can not take care of him from where she is in [place]  Patient has a result was admitted to the hospital primarily for social reasons.  I will need case management to get him to nursing home as quickly as possible.    03/07/2025   Staff present during examination : Obed Willis RN  Patient is postop day 2  Vital signs are stable afebrile   Patient is doing well progressing appropriately   Awaiting nursing home placement or skilled unit placement.  Patient is undergoing physical therapy psychiatry has been consulted.  Dressings are being changed.  Patient is unable to take care of  himself at home due to his roommate not allowing home health to come over to take care of him.  He is debilitated deconditioned as feelings of hopelessness and depression.  My intention will be to await case management placement of the patient    03/08/2025  Postop day 2  Staff present during examination : Anabelle Ocampo LPN  Patient's vital signs are stable afebrile  Wounds are clean and dry   Tolerating diet, awaiting placement in a nursing home or sniff unit   Nothing to add at this point

## 2025-03-08 NOTE — HOSPITAL COURSE
In better spirits today.      slept well last night.      Says pain is well-controlled no shortness a breath no nausea vomiting.      Still with weakness is tolerating physical well

## 2025-03-08 NOTE — PROGRESS NOTES
Date of Procedure: 3/6/2025      Procedure: Procedure(s) (LRB):  BIOPSY, LYMPH NODE (Left axillary node) (Left)   Excisional biopsy of 2 lymph nodes in the left axilla sent for node protocol and histo path  Surgeons and Role:     * Tima Montano MD - Primary     Assisting Surgeon: None     Anesthesia Staff Present in Procedure:   Anesthesiologist: Gomez Pena DO  CRNA: Kayode Edmond, AMRITA     OR Staff Present in Procedure:  Circulator: Tray Potter RN  Scrub Person: Erika Marroquin STFA; Kaden Vergara ST  Registered Nurse: Smiley Manning RN     Pre-Operative Diagnosis: Localized enlarged lymph nodes [R59.0]     Post-Operative Diagnosis: Post-Op Diagnosis Codes:     * Localized enlarged lymph nodes [R59.0]  BIOPSY, LYMPH NODE (Left axillary node) (Left)   Excisional biopsy of 2 lymph nodes in the left axilla sent for node protocol and histo path  Anesthesia: General    03/06/2025   Staff present during examination : Tawanna Suarez RN  Vital signs stable afebrile  Patient was unable to go home because he is unable to take care of himself   He needs nursing home placement   He has requested this and as a result I admitted the patient for assistance in nursing home placement   Patient lives with a roommate who does not desire to have home health come to their house  His daughter is also present in the room and can not take care of him from where she is in [place]  Patient has a result was admitted to the hospital primarily for social reasons.  I will need case management to get him to nursing home as quickly as possible.    03/07/2025   Staff present during examination : Obed Willis RN  Patient is postop day 2  Vital signs are stable afebrile   Patient is doing well progressing appropriately   Awaiting nursing home placement or skilled unit placement.  Patient is undergoing physical therapy psychiatry has been consulted.  Dressings are being changed.  Patient is unable to take care of  himself at home due to his roommate not allowing home health to come over to take care of him.  He is debilitated deconditioned as feelings of hopelessness and depression.  My intention will be to await case management placement of the patient    03/08/2025  Postop day 2  Staff present during examination : Anabelle Ocampo LPN  Patient's vital signs are stable afebrile  Wounds are clean and dry   Tolerating diet, awaiting placement in a nursing home or sniff unit   Nothing to add at this point    Patient reports that his niece we will be taking care of him at home  He will be discharged later today if that is true.  Condition is good   Disposition to home   Further workup with regard to nursing home placement we will be accomplished I suspect as outpatient maybe?

## 2025-03-08 NOTE — NURSING
PT family member reported, at bedside, she will care for him at him if he is not accept to SNF     Reported to Dr Leandro Montano and D/C cancelled PT also aware per Juan Hernandez

## 2025-03-08 NOTE — SUBJECTIVE & OBJECTIVE
Interval History:  1 day    Review of Systems   Constitutional:  Positive for activity change and fatigue.   HENT: Negative.     Eyes: Negative.    Respiratory: Negative.     Cardiovascular: Negative.    Gastrointestinal: Negative.    Endocrine: Negative.    Genitourinary: Negative.    Musculoskeletal:  Positive for arthralgias.   Skin: Negative.    Neurological:  Positive for weakness.   Hematological:  Positive for adenopathy.   Psychiatric/Behavioral: Negative.  Negative for confusion and decreased concentration.      Objective:     Vital Signs (Most Recent):  Temp: 98.1 °F (36.7 °C) (03/08/25 1040)  Pulse: 66 (03/08/25 1040)  Resp: 20 (03/08/25 1040)  BP: 136/77 (03/08/25 1040)  SpO2: (!) 93 % (03/08/25 1040) Vital Signs (24h Range):  Temp:  [98.1 °F (36.7 °C)-99 °F (37.2 °C)] 98.1 °F (36.7 °C)  Pulse:  [59-73] 66  Resp:  [18-20] 20  SpO2:  [92 %-95 %] 93 %  BP: (117-141)/(62-77) 136/77     Weight: 60.3 kg (132 lb 15 oz)  Body mass index is 21.46 kg/m².    Intake/Output Summary (Last 24 hours) at 3/8/2025 1209  Last data filed at 3/8/2025 0907  Gross per 24 hour   Intake 420 ml   Output 425 ml   Net -5 ml         Physical Exam          Significant Labs: All pertinent labs within the past 24 hours have been reviewed.  BMP:   Recent Labs   Lab 03/08/25  0331   *   K 4.0   CL 99   CO2 25   BUN 14.0   CREATININE 0.68*   CALCIUM 8.8   MG 2.00     CBC:   Recent Labs   Lab 03/07/25  0455 03/08/25  0331   WBC 8.77 8.15   HGB 11.0* 10.9*   HCT 33.3* 32.6*    274       Significant Imaging: I have reviewed all pertinent imaging results/findings within the past 24 hours.

## 2025-03-08 NOTE — PLAN OF CARE
Problem: Adult Inpatient Plan of Care  Goal: Plan of Care Review  Outcome: Progressing  Goal: Patient-Specific Goal (Individualized)  Outcome: Progressing  Goal: Absence of Hospital-Acquired Illness or Injury  Outcome: Progressing  Goal: Optimal Comfort and Wellbeing  Outcome: Progressing  Goal: Readiness for Transition of Care  Outcome: Progressing     Problem: Wound  Goal: Optimal Coping  Outcome: Progressing  Goal: Optimal Functional Ability  Outcome: Progressing  Goal: Absence of Infection Signs and Symptoms  Outcome: Progressing  Goal: Improved Oral Intake  Outcome: Progressing  Goal: Optimal Pain Control and Function  Outcome: Progressing  Goal: Skin Health and Integrity  Outcome: Progressing  Goal: Optimal Wound Healing  Outcome: Progressing     Problem: Wound  Goal: Optimal Coping  Outcome: Progressing

## 2025-03-08 NOTE — ASSESSMENT & PLAN NOTE
Patient with Acute on chronic debility due to other reduced mobility. The patient's latest AMPAC (Activity Measure for Post Acute Care) Score is listed below.    AM-PAC Score - How much help does the patient need for each activity listed  Basic Mobility Total Score: 11  Turning over in bed (including adjusting bedclothes, sheets and blankets)?: A lot  Sitting down on and standing up from a chair with arms (e.g., wheelchair, bedside commode, etc.): A lot  Moving from lying on back to sitting on the side of the bed?: A lot  Moving to and from a bed to a chair (including a wheelchair)?: A lot  Need to walk in hospital room?: A lot  Climbing 3-5 steps with a railing?: Unable    Plan  - Progressive mobility protocol initated  - we will put in consult for skilled nursing for prolonged physical therapy

## 2025-03-08 NOTE — NURSING
Александр consult completed per Tele with CRISTINA Lopez and recommended to remain on current dose of Tramadol 50 mg. Spoke with Dr. Galindo to continue home med of Ambien 10 mg at hs and Valium 10 once a day. New orders noted and carried out.

## 2025-03-08 NOTE — ASSESSMENT & PLAN NOTE
Patient's blood pressure range in the last 24 hours was: BP  Min: 117/67  Max: 141/77.The patient's inpatient anti-hypertensive regimen is listed below:  Current Antihypertensives  , 2 times daily, Oral  furosemide injection 20 mg, Once, Intravenous  lisinopriL tablet 10 mg, Every morning, Oral  metoprolol tartrate (LOPRESSOR) tablet 25 mg, 2 times daily, Oral    Plan  - BP is controlled, no changes needed to their regimen  - ent meds

## 2025-03-08 NOTE — PROGRESS NOTES
Date of Procedure: 3/6/2025      Procedure: Procedure(s) (LRB):  BIOPSY, LYMPH NODE (Left axillary node) (Left)   Excisional biopsy of 2 lymph nodes in the left axilla sent for node protocol and histo path  Surgeons and Role:     * Tima Montano MD - Primary     Assisting Surgeon: None     Anesthesia Staff Present in Procedure:   Anesthesiologist: Gomez Pena DO  CRNA: Kayode Edmond, AMRITA     OR Staff Present in Procedure:  Circulator: Tray Potter RN  Scrub Person: Erika Marroquin STFA; Kaden Vergara ST  Registered Nurse: Smiley Manning RN     Pre-Operative Diagnosis: Localized enlarged lymph nodes [R59.0]     Post-Operative Diagnosis: Post-Op Diagnosis Codes:     * Localized enlarged lymph nodes [R59.0]  BIOPSY, LYMPH NODE (Left axillary node) (Left)   Excisional biopsy of 2 lymph nodes in the left axilla sent for node protocol and histo path  Anesthesia: General    03/06/2025   Staff present during examination : Tawanna Suarez RN  Vital signs stable afebrile  Patient was unable to go home because he is unable to take care of himself   He needs nursing home placement   He has requested this and as a result I admitted the patient for assistance in nursing home placement   Patient lives with a roommate who does not desire to have home health come to their house  His daughter is also present in the room and can not take care of him from where she is in [place]  Patient has a result was admitted to the hospital primarily for social reasons.  I will need case management to get him to nursing home as quickly as possible.    03/07/2025   Staff present during examination : Obed Willis RN  Patient is postop day 2  Vital signs are stable afebrile   Patient is doing well progressing appropriately   Awaiting nursing home placement or skilled unit placement.  Patient is undergoing physical therapy psychiatry has been consulted.  Dressings are being changed.  Patient is unable to take care of  himself at home due to his roommate not allowing home health to come over to take care of him.  He is debilitated deconditioned as feelings of hopelessness and depression.  My intention will be to await case management placement of the patient

## 2025-03-09 PROBLEM — N28.9 RENAL INSUFFICIENCY: Status: RESOLVED | Noted: 2025-03-09 | Resolved: 2025-03-09

## 2025-03-09 PROBLEM — N28.9 RENAL INSUFFICIENCY: Status: ACTIVE | Noted: 2025-03-09

## 2025-03-09 LAB
ALBUMIN SERPL-MCNC: 2.7 G/DL (ref 3.4–4.8)
ALBUMIN/GLOB SERPL: 0.8 RATIO (ref 1.1–2)
ALP SERPL-CCNC: 133 UNIT/L (ref 40–150)
ALT SERPL-CCNC: 11 UNIT/L (ref 0–55)
ANION GAP SERPL CALC-SCNC: 8 MEQ/L
AST SERPL-CCNC: 13 UNIT/L (ref 5–34)
BASOPHILS # BLD AUTO: 0.05 X10(3)/MCL
BASOPHILS NFR BLD AUTO: 0.7 %
BILIRUB SERPL-MCNC: 0.3 MG/DL
BUN SERPL-MCNC: 14 MG/DL (ref 8.4–25.7)
CALCIUM SERPL-MCNC: 8.4 MG/DL (ref 8.8–10)
CHLORIDE SERPL-SCNC: 99 MMOL/L (ref 98–107)
CO2 SERPL-SCNC: 25 MMOL/L (ref 23–31)
CREAT SERPL-MCNC: 0.63 MG/DL (ref 0.72–1.25)
CREAT/UREA NIT SERPL: 22
EOSINOPHIL # BLD AUTO: 0.17 X10(3)/MCL (ref 0–0.9)
EOSINOPHIL NFR BLD AUTO: 2.3 %
ERYTHROCYTE [DISTWIDTH] IN BLOOD BY AUTOMATED COUNT: 13.8 % (ref 11.5–17)
GFR SERPLBLD CREATININE-BSD FMLA CKD-EPI: >60 ML/MIN/1.73/M2
GLOBULIN SER-MCNC: 3.5 GM/DL (ref 2.4–3.5)
GLUCOSE SERPL-MCNC: 109 MG/DL (ref 82–115)
HCT VFR BLD AUTO: 32.8 % (ref 42–52)
HGB BLD-MCNC: 11 G/DL (ref 14–18)
IMM GRANULOCYTES # BLD AUTO: 0.02 X10(3)/MCL (ref 0–0.04)
IMM GRANULOCYTES NFR BLD AUTO: 0.3 %
LYMPHOCYTES # BLD AUTO: 1.13 X10(3)/MCL (ref 0.6–4.6)
LYMPHOCYTES NFR BLD AUTO: 15.4 %
MCH RBC QN AUTO: 30.1 PG (ref 27–31)
MCHC RBC AUTO-ENTMCNC: 33.5 G/DL (ref 33–36)
MCV RBC AUTO: 89.9 FL (ref 80–94)
MONOCYTES # BLD AUTO: 0.72 X10(3)/MCL (ref 0.1–1.3)
MONOCYTES NFR BLD AUTO: 9.8 %
NEUTROPHILS # BLD AUTO: 5.25 X10(3)/MCL (ref 2.1–9.2)
NEUTROPHILS NFR BLD AUTO: 71.5 %
NRBC BLD AUTO-RTO: 0 %
PLATELET # BLD AUTO: 280 X10(3)/MCL (ref 130–400)
PMV BLD AUTO: 10.6 FL (ref 7.4–10.4)
POTASSIUM SERPL-SCNC: 3.7 MMOL/L (ref 3.5–5.1)
PROT SERPL-MCNC: 6.2 GM/DL (ref 5.8–7.6)
RBC # BLD AUTO: 3.65 X10(6)/MCL (ref 4.7–6.1)
SODIUM SERPL-SCNC: 132 MMOL/L (ref 136–145)
WBC # BLD AUTO: 7.34 X10(3)/MCL (ref 4.5–11.5)

## 2025-03-09 PROCEDURE — 25000003 PHARM REV CODE 250: Performed by: FAMILY MEDICINE

## 2025-03-09 PROCEDURE — 11000001 HC ACUTE MED/SURG PRIVATE ROOM

## 2025-03-09 PROCEDURE — 36415 COLL VENOUS BLD VENIPUNCTURE: CPT | Performed by: FAMILY MEDICINE

## 2025-03-09 PROCEDURE — 80053 COMPREHEN METABOLIC PANEL: CPT | Performed by: FAMILY MEDICINE

## 2025-03-09 PROCEDURE — 85025 COMPLETE CBC W/AUTO DIFF WBC: CPT | Performed by: FAMILY MEDICINE

## 2025-03-09 PROCEDURE — 94761 N-INVAS EAR/PLS OXIMETRY MLT: CPT

## 2025-03-09 PROCEDURE — 25000003 PHARM REV CODE 250: Performed by: INTERNAL MEDICINE

## 2025-03-09 PROCEDURE — 25000003 PHARM REV CODE 250: Performed by: SURGERY

## 2025-03-09 RX ORDER — DULOXETIN HYDROCHLORIDE 30 MG/1
30 CAPSULE, DELAYED RELEASE ORAL 2 TIMES DAILY
Status: DISCONTINUED | OUTPATIENT
Start: 2025-03-09 | End: 2025-03-10 | Stop reason: HOSPADM

## 2025-03-09 RX ADMIN — PHENOBARBITAL 97.2 MG: 32.4 TABLET ORAL at 08:03

## 2025-03-09 RX ADMIN — TRAZODONE HYDROCHLORIDE 50 MG: 50 TABLET ORAL at 08:03

## 2025-03-09 RX ADMIN — LISINOPRIL 10 MG: 10 TABLET ORAL at 05:03

## 2025-03-09 RX ADMIN — LEVOTHYROXINE SODIUM 150 MCG: 0.07 TABLET ORAL at 05:03

## 2025-03-09 RX ADMIN — METOPROLOL TARTRATE 25 MG: 25 TABLET, FILM COATED ORAL at 08:03

## 2025-03-09 RX ADMIN — DULOXETINE 30 MG: 30 CAPSULE, DELAYED RELEASE ORAL at 08:03

## 2025-03-09 RX ADMIN — METOPROLOL TARTRATE 25 MG: 25 TABLET, FILM COATED ORAL at 09:03

## 2025-03-09 RX ADMIN — RIVAROXABAN 20 MG: 10 TABLET, FILM COATED ORAL at 05:03

## 2025-03-09 RX ADMIN — ZOLPIDEM TARTRATE 5 MG: 5 TABLET ORAL at 08:03

## 2025-03-09 RX ADMIN — IBUPROFEN 400 MG: 100 SUSPENSION ORAL at 11:03

## 2025-03-09 RX ADMIN — DULOXETINE 30 MG: 30 CAPSULE, DELAYED RELEASE ORAL at 11:03

## 2025-03-09 RX ADMIN — ASPIRIN 81 MG: 81 TABLET, COATED ORAL at 08:03

## 2025-03-09 NOTE — SUBJECTIVE & OBJECTIVE
Interval History:  1 day    Review of Systems   Constitutional:  Positive for activity change and fatigue.   HENT: Negative.     Eyes: Negative.    Respiratory: Negative.     Cardiovascular: Negative.    Gastrointestinal: Negative.    Endocrine: Negative.    Genitourinary: Negative.    Musculoskeletal:  Positive for arthralgias.   Skin: Negative.    Neurological:  Positive for weakness.   Hematological:  Positive for adenopathy.   Psychiatric/Behavioral: Negative.  Negative for confusion and decreased concentration.      Objective:     Vital Signs (Most Recent):  Temp: 98.4 °F (36.9 °C) (03/09/25 1016)  Pulse: 71 (03/09/25 1016)  Resp: 20 (03/09/25 1016)  BP: (!) 153/77 (03/09/25 1016)  SpO2: 96 % (03/09/25 1016) Vital Signs (24h Range):  Temp:  [97.7 °F (36.5 °C)-98.4 °F (36.9 °C)] 98.4 °F (36.9 °C)  Pulse:  [58-73] 71  Resp:  [20] 20  SpO2:  [92 %-96 %] 96 %  BP: (126-157)/(69-77) 153/77     Weight: 60.3 kg (132 lb 15 oz)  Body mass index is 21.46 kg/m².    Intake/Output Summary (Last 24 hours) at 3/9/2025 1102  Last data filed at 3/9/2025 0449  Gross per 24 hour   Intake 360 ml   Output 400 ml   Net -40 ml         Physical Exam          Significant Labs: All pertinent labs within the past 24 hours have been reviewed.  BMP:   Recent Labs   Lab 03/08/25 0331 03/09/25 0339   * 132*   K 4.0 3.7   CL 99 99   CO2 25 25   BUN 14.0 14.0   CREATININE 0.68* 0.63*   CALCIUM 8.8 8.4*   MG 2.00  --      CBC:   Recent Labs   Lab 03/08/25 0331 03/09/25 0339   WBC 8.15 7.34   HGB 10.9* 11.0*   HCT 32.6* 32.8*    280       Significant Imaging: I have reviewed all pertinent imaging results/findings within the past 24 hours.

## 2025-03-09 NOTE — PROGRESS NOTES
[facility] Medicine  Progress Note    Patient Name: Rickey Navarro  MRN: [id number]  Patient Class: IP- Inpatient   Admission Date: 3/6/2025  Length of Stay: 3 days  Attending Physician: Tima Montano MD  Primary Care Provider: Maxime Galindo MD        Subjective     Principal Problem:Enlarged lymph nodes        HPI:  No notes on file    Overview/Hospital Course:  In better spirits today.      slept well last night.      Says pain is well-controlled no shortness a breath no nausea vomiting.      Still with weakness is tolerating physical well    Interval History:  1 day    Review of Systems   Constitutional:  Positive for activity change and fatigue.   HENT: Negative.     Eyes: Negative.    Respiratory: Negative.     Cardiovascular: Negative.    Gastrointestinal: Negative.    Endocrine: Negative.    Genitourinary: Negative.    Musculoskeletal:  Positive for arthralgias.   Skin: Negative.    Neurological:  Positive for weakness.   Hematological:  Positive for adenopathy.   Psychiatric/Behavioral: Negative.  Negative for confusion and decreased concentration.      Objective:     Vital Signs (Most Recent):  Temp: 98.4 °F (36.9 °C) (03/09/25 1016)  Pulse: 71 (03/09/25 1016)  Resp: 20 (03/09/25 1016)  BP: (!) 153/77 (03/09/25 1016)  SpO2: 96 % (03/09/25 1016) Vital Signs (24h Range):  Temp:  [97.7 °F (36.5 °C)-98.4 °F (36.9 °C)] 98.4 °F (36.9 °C)  Pulse:  [58-73] 71  Resp:  [20] 20  SpO2:  [92 %-96 %] 96 %  BP: (126-157)/(69-77) 153/77     Weight: 60.3 kg (132 lb 15 oz)  Body mass index is 21.46 kg/m².    Intake/Output Summary (Last 24 hours) at 3/9/2025 1102  Last data filed at 3/9/2025 0449  Gross per 24 hour   Intake 360 ml   Output 400 ml   Net -40 ml         Physical Exam          Significant Labs: All pertinent labs within the past 24 hours have been reviewed.  BMP:   Recent Labs   Lab 03/08/25  0331 03/09/25  0339   * 132*   K 4.0 3.7   CL 99 99   CO2 25 25    BUN 14.0 14.0   CREATININE 0.68* 0.63*   CALCIUM 8.8 8.4*   MG 2.00  --      CBC:   Recent Labs   Lab 03/08/25  0331 03/09/25  0339   WBC 8.15 7.34   HGB 10.9* 11.0*   HCT 32.6* 32.8*    280       Significant Imaging: I have reviewed all pertinent imaging results/findings within the past 24 hours.      Assessment & Plan  Enlarged lymph nodes  Status post biopsy results pending    Hypertension  Patient's blood pressure range in the last 24 hours was: BP  Min: 126/69  Max: 157/76.The patient's inpatient anti-hypertensive regimen is listed below:  Current Antihypertensives  , 2 times daily, Oral  furosemide injection 20 mg, Once, Intravenous  lisinopriL tablet 10 mg, Every morning, Oral  metoprolol tartrate (LOPRESSOR) tablet 25 mg, 2 times daily, Oral    Plan  - BP is controlled, no changes needed to their regimen  - ent meds  Debility  Patient with Acute on chronic debility due to other reduced mobility. The patient's latest AMPAC (Activity Measure for Post Acute Care) Score is listed below.    AM-PAC Score - How much help does the patient need for each activity listed  Basic Mobility Total Score: 11  Turning over in bed (including adjusting bedclothes, sheets and blankets)?: A lot  Sitting down on and standing up from a chair with arms (e.g., wheelchair, bedside commode, etc.): A lot  Moving from lying on back to sitting on the side of the bed?: A lot  Moving to and from a bed to a chair (including a wheelchair)?: A lot  Need to walk in hospital room?: A lot  Climbing 3-5 steps with a railing?: Unable    Plan  - Progressive mobility protocol initated  - we will put in consult for skilled nursing for prolonged physical therapy        Moderate episode of recurrent major depressive disorder  Psych consult has placed on medication for sleep  Added duloxetine 30 mg po qd    VTE Risk Mitigation (From admission, onward)           Ordered     rivaroxaban tablet 20 mg  Every morning         03/06/25 2004     IP VTE  HIGH RISK PATIENT  Once         03/06/25 0805     Place sequential compression device  Until discontinued         03/06/25 0805                    Discharge Planning   DINAH: 3/8/2025     Code Status: Prior   Medical Readiness for Discharge Date: 3/6/2025                           Supa Kaur MD  Department of Hospital Medicine   [facility]

## 2025-03-09 NOTE — ASSESSMENT & PLAN NOTE
Patient's blood pressure range in the last 24 hours was: BP  Min: 126/69  Max: 157/76.The patient's inpatient anti-hypertensive regimen is listed below:  Current Antihypertensives  , 2 times daily, Oral  furosemide injection 20 mg, Once, Intravenous  lisinopriL tablet 10 mg, Every morning, Oral  metoprolol tartrate (LOPRESSOR) tablet 25 mg, 2 times daily, Oral    Plan  - BP is controlled, no changes needed to their regimen  - ent meds

## 2025-03-10 VITALS
DIASTOLIC BLOOD PRESSURE: 69 MMHG | HEART RATE: 68 BPM | WEIGHT: 132.94 LBS | RESPIRATION RATE: 20 BRPM | BODY MASS INDEX: 21.36 KG/M2 | SYSTOLIC BLOOD PRESSURE: 138 MMHG | HEIGHT: 66 IN | OXYGEN SATURATION: 95 % | TEMPERATURE: 98 F

## 2025-03-10 LAB
BASOPHILS # BLD AUTO: 0.03 X10(3)/MCL
BASOPHILS NFR BLD AUTO: 0.4 %
EOSINOPHIL # BLD AUTO: 0.09 X10(3)/MCL (ref 0–0.9)
EOSINOPHIL NFR BLD AUTO: 1.3 %
ERYTHROCYTE [DISTWIDTH] IN BLOOD BY AUTOMATED COUNT: 13.7 % (ref 11.5–17)
HCT VFR BLD AUTO: 31.7 % (ref 42–52)
HGB BLD-MCNC: 10.8 G/DL (ref 14–18)
IMM GRANULOCYTES # BLD AUTO: 0.02 X10(3)/MCL (ref 0–0.04)
IMM GRANULOCYTES NFR BLD AUTO: 0.3 %
LYMPHOCYTES # BLD AUTO: 0.85 X10(3)/MCL (ref 0.6–4.6)
LYMPHOCYTES NFR BLD AUTO: 12.4 %
MCH RBC QN AUTO: 30.6 PG (ref 27–31)
MCHC RBC AUTO-ENTMCNC: 34.1 G/DL (ref 33–36)
MCV RBC AUTO: 89.8 FL (ref 80–94)
MONOCYTES # BLD AUTO: 0.69 X10(3)/MCL (ref 0.1–1.3)
MONOCYTES NFR BLD AUTO: 10.1 %
NEUTROPHILS # BLD AUTO: 5.16 X10(3)/MCL (ref 2.1–9.2)
NEUTROPHILS NFR BLD AUTO: 75.5 %
NRBC BLD AUTO-RTO: 0 %
PLATELET # BLD AUTO: 268 X10(3)/MCL (ref 130–400)
PMV BLD AUTO: 10.2 FL (ref 7.4–10.4)
RBC # BLD AUTO: 3.53 X10(6)/MCL (ref 4.7–6.1)
WBC # BLD AUTO: 6.84 X10(3)/MCL (ref 4.5–11.5)

## 2025-03-10 PROCEDURE — 97116 GAIT TRAINING THERAPY: CPT

## 2025-03-10 PROCEDURE — 25000003 PHARM REV CODE 250: Performed by: SURGERY

## 2025-03-10 PROCEDURE — 97110 THERAPEUTIC EXERCISES: CPT

## 2025-03-10 PROCEDURE — 85025 COMPLETE CBC W/AUTO DIFF WBC: CPT | Performed by: FAMILY MEDICINE

## 2025-03-10 PROCEDURE — G0378 HOSPITAL OBSERVATION PER HR: HCPCS

## 2025-03-10 PROCEDURE — 25000003 PHARM REV CODE 250: Performed by: INTERNAL MEDICINE

## 2025-03-10 PROCEDURE — 25000003 PHARM REV CODE 250: Performed by: FAMILY MEDICINE

## 2025-03-10 PROCEDURE — 36415 COLL VENOUS BLD VENIPUNCTURE: CPT | Performed by: FAMILY MEDICINE

## 2025-03-10 PROCEDURE — 94761 N-INVAS EAR/PLS OXIMETRY MLT: CPT

## 2025-03-10 RX ORDER — DIAZEPAM 5 MG/1
10 TABLET ORAL NIGHTLY
Status: DISCONTINUED | OUTPATIENT
Start: 2025-03-10 | End: 2025-03-10 | Stop reason: HOSPADM

## 2025-03-10 RX ADMIN — METOPROLOL TARTRATE 25 MG: 25 TABLET, FILM COATED ORAL at 08:03

## 2025-03-10 RX ADMIN — ACETAMINOPHEN 650 MG: 325 TABLET, FILM COATED ORAL at 11:03

## 2025-03-10 RX ADMIN — DULOXETINE 30 MG: 30 CAPSULE, DELAYED RELEASE ORAL at 08:03

## 2025-03-10 RX ADMIN — RIVAROXABAN 20 MG: 10 TABLET, FILM COATED ORAL at 05:03

## 2025-03-10 RX ADMIN — IBUPROFEN 400 MG: 100 SUSPENSION ORAL at 11:03

## 2025-03-10 RX ADMIN — LEVOTHYROXINE SODIUM 150 MCG: 0.07 TABLET ORAL at 05:03

## 2025-03-10 RX ADMIN — LISINOPRIL 10 MG: 10 TABLET ORAL at 05:03

## 2025-03-10 NOTE — DISCHARGE SUMMARY
Date of Procedure: 3/6/2025      Procedure: Procedure(s) (LRB):  BIOPSY, LYMPH NODE (Left axillary node) (Left)   Excisional biopsy of 2 lymph nodes in the left axilla sent for node protocol and histo path  Surgeons and Role:     * Tima Montano MD - Primary     Assisting Surgeon: None     Anesthesia Staff Present in Procedure:   Anesthesiologist: Gomez Pena DO  CRNA: Kayode Edmond, AMRITA     OR Staff Present in Procedure:  Circulator: Tray Potter RN  Scrub Person: Erika Marroquin STFA; Kaden Vergara ST  Registered Nurse: Smiley Manning RN     Pre-Operative Diagnosis: Localized enlarged lymph nodes [R59.0]     Post-Operative Diagnosis: Post-Op Diagnosis Codes:     * Localized enlarged lymph nodes [R59.0]  BIOPSY, LYMPH NODE (Left axillary node) (Left)   Excisional biopsy of 2 lymph nodes in the left axilla sent for node protocol and histo path  Anesthesia: General    03/06/2025   Staff present during examination : Tawanna Suarez RN  Vital signs stable afebrile  Patient was unable to go home because he is unable to take care of himself   He needs nursing home placement   He has requested this and as a result I admitted the patient for assistance in nursing home placement   Patient lives with a roommate who does not desire to have home health come to their house  His daughter is also present in the room and can not take care of him from where she is in [place]  Patient has a result was admitted to the hospital primarily for social reasons.  I will need case management to get him to nursing home as quickly as possible.    03/07/2025   Staff present during examination : Obed Willis RN  Patient is postop day 2  Vital signs are stable afebrile   Patient is doing well progressing appropriately   Awaiting nursing home placement or skilled unit placement.  Patient is undergoing physical therapy psychiatry has been consulted.  Dressings are being changed.  Patient is unable to take care of  himself at home due to his roommate not allowing home health to come over to take care of him.  He is debilitated deconditioned as feelings of hopelessness and depression.  My intention will be to await case management placement of the patient    03/08/2025  Postop day 2  Staff present during examination : Anabelle Ocampo LPN  Patient's vital signs are stable afebrile  Wounds are clean and dry   Tolerating diet, awaiting placement in a nursing home or sniff unit   Nothing to add at this point    Patient reports that his niece we will be taking care of him at home  He will be discharged later today if that is true.  Condition is good   Disposition to home   Further workup with regard to nursing home placement we will be accomplished I suspect as outpatient maybe?    03/09/2025   Postop day 3   Staff present during examination : Anabelle Ocampo LPN  Patient is doing well vital signs are stable afebrile   White count 7 hemoglobin 11 hematocrit 32 platelet count was 280  Renal profile is unremarkable BUN is 14 with a creatinine of 0.63.  Overall the patient is doing well progressing appropriately and we will be discharged probably in the morning to his sister's place if LTAC, rehab, nursing home, home health sniff unit not available    03/10/2025   Postop day 4  Staff present during examination : Germán Winslow LPN  Patient is doing well without any issues case management has settled with him going to the house with his sister.  Nursing homes and other facilities are not accepting at this time.  Condition is stable  Disposition to home

## 2025-03-10 NOTE — CODE 44
"MEDICARE CONDITION 44     (Reference: Transmittal 200 of the Medicare Manual)     A Medicare "Inpatient Admission" may be changed to an "Outpatient" (includes  Outpatient Observation) status, if the following conditions exist:  The change in patient status from inpatient to outpatient is made prior to        discharge or release, while the patient is still a patient in the hospital.   The hospital has not submitted a claim for the inpatient admission.  A physician concurs with the Utilization Committee decision.   Physician concurrence with the Utilization Committee's decision is documented         in the patient's records.      The entire case has been reviewed with Tima Montano MD, attending physician and me, physician advisor/member of the Utilization Management Committee. We are both in agreement that this should be an Outpatient/Observation encounter because the patient did not meet medical necessity for inpatient admission. The patient and/or patient representative have been notified of the change in billing status.           Mario Monk MD  Utilization Management  Physician Advisor  [facility]      "

## 2025-03-10 NOTE — PT/OT/SLP PROGRESS
"Physical Therapy Treatment    Patient Name:  Rickey Navarro   MRN:  [id number]    Recommendations:     Discharge Recommendations: High Intensity Therapy  Discharge Equipment Recommendations: bedside commode, bath bench, walker, rolling  Barriers to discharge:  current medical status    Assessment:     Rickey Navarro is a 74 y.o. male admitted with a medical diagnosis of Enlarged lymph nodes.  He presents with the following impairments/functional limitations: weakness, impaired endurance, impaired sensation, impaired self care skills, impaired functional mobility, gait instability, impaired balance, visual deficits, impaired cognition, decreased coordination, decreased upper extremity function, decreased lower extremity function, decreased safety awareness, pain, abnormal tone, decreased ROM, impaired coordination, impaired fine motor, impaired skin, edema, impaired joint extensibility, impaired cardiopulmonary response to activity, impaired muscle length .    Pt states his left shoulder was feeling better until he began moving it today at breakfast, is very sore now.  He is able to sit up from supine Shad, stand from the bed CGA, amb in the hallway 65,70ft RW CGA with early fatigue and c/o pain R lower abdomen, tf to chair Shad.  He was instructed in seated BLE exercises and plans to sit up today.    Rehab Prognosis: Good; patient would benefit from acute skilled PT services to address these deficits and reach maximum level of function.    Recent Surgery: Procedure(s) (LRB):  EXCISIONAL BIOPSY, LYMPH NODE (Left) 4 Days Post-Op    Plan:     During this hospitalization, patient to be seen 6 x/week to address the identified rehab impairments via gait training, therapeutic activities, therapeutic exercises, neuromuscular re-education, wheelchair management/training and progress toward the following goals:    Plan of Care Expires:       Subjective     Chief Complaint: "I laid in the bed yesterday and now I am " "stiff"  Patient/Family Comments/goals: to move around easier and require less help  Pain/Comfort:         Objective:     Communicated with pt, CG prior to session.  Patient found HOB elevated with   upon PT entry to room.     General Precautions: Standard, fall  Orthopedic Precautions:    Braces:    Respiratory Status: Room air     Functional Mobility:  Bed Mobility:     Scooting: minimum assistance  Supine to Sit: minimum assistance  Transfers:     Sit to Stand:  contact guard assistance with rolling walker  Gait: Pt trained gait 65 and 70ft CGA RW early fatigue with activity, unsteady gt, decreased elvia, forward lean, decreased heel strike, antalgic pattern with R lower abd pain, decreased  LUE due to pain and swelling LUE     Balance: fair-      AM-PAC 6 CLICK MOBILITY          Treatment & Education:  Pt tf to chair Shad and trained BLE AROM ex with PT providing demo and cues technique and encouragement.   arrived to discuss DC plan.  Will return later today to amb with pt again.  CG declined chair alarm due to she will be present all day and supervised pt safety, plans to take him home to live with her in the future.    Patient left up in chair with all lines intact, call button in reach, and CG and  present..    GOALS:   Multidisciplinary Problems       Physical Therapy Goals          Problem: Physical Therapy    Goal Priority Disciplines Outcome Interventions   Physical Therapy Goal     PT, PT/OT Progressing    Description: 1.  Pt will be I HEP to maintain strength and AROM while inpt  2.  Pt will be educated fall prevention and safety  3.  Pt will amb 150ft RW SBA to ease ADLS  4.  Pt will improve tf to SBA to reduce the burden of care  5. Pt will train ADLS to improve functional indep and safety                       DME Justifications:   Rickey requires a commode for home use because he is confined to a single room.   Rickey's mobility limitation cannot be sufficiently resolved " by the use of a cane. His functional mobility deficit can be sufficiently resolved with the use of a Rolling Walker. Patient's mobility limitation significantly impairs their ability to participate in one of more activities of daily living.  The use of a RW will significantly improve the patient's ability to participate in MRADLS and the patient will use it on regular basis in the home.    Time Tracking:     PT Received On: 03/10/25  PT Start Time: 0900     PT Stop Time: 0930  PT Total Time (min): 30 min     Billable Minutes: Gait Training 10, Therapeutic Activity 5, and Therapeutic Exercise 15    Treatment Type: Treatment  PT/PTA: PT           03/10/2025

## 2025-03-10 NOTE — NURSING
Dr. MIRNA Montano notified that patient is cleared to discharge home, no new orders at this time.

## 2025-03-10 NOTE — PLAN OF CARE
03/10/25 1100   Discharge Reassessment   Assessment Type Discharge Planning Reassessment   Did the patient's condition or plan change since previous assessment? Yes   Discharge Plan discussed with: Patient;Sibling   Discharge Plan A Other  (Outpatient PT at [facility])   Discharge Plan B Other  (Outpatient PT at [facility])   DME Needed Upon Discharge  none   Transition of Care Barriers None   Post-Acute Status   Post-Acute Authorization Other  (Outpatient therapy-[facility])   Hospital Resources/Appts/Education Provided Post-Acute resouces added to AVS   Discharge Delays Orders Needed     Referral sent to [facility], as requested by patient and his sister. Patient will be going to stay with his sister at her home.

## 2025-03-10 NOTE — PROGRESS NOTES
Date of Procedure: 3/6/2025      Procedure: Procedure(s) (LRB):  BIOPSY, LYMPH NODE (Left axillary node) (Left)   Excisional biopsy of 2 lymph nodes in the left axilla sent for node protocol and histo path  Surgeons and Role:     * Tima Montano MD - Primary     Assisting Surgeon: None     Anesthesia Staff Present in Procedure:   Anesthesiologist: Gomez Pena DO  CRNA: Kayode Edmond, AMRITA     OR Staff Present in Procedure:  Circulator: Tray Potter RN  Scrub Person: Erika Marroquin STFA; Kaden Vergara ST  Registered Nurse: Smiley Manning RN     Pre-Operative Diagnosis: Localized enlarged lymph nodes [R59.0]     Post-Operative Diagnosis: Post-Op Diagnosis Codes:     * Localized enlarged lymph nodes [R59.0]  BIOPSY, LYMPH NODE (Left axillary node) (Left)   Excisional biopsy of 2 lymph nodes in the left axilla sent for node protocol and histo path  Anesthesia: General    03/06/2025   Staff present during examination : Tawanna Suarez RN  Vital signs stable afebrile  Patient was unable to go home because he is unable to take care of himself   He needs nursing home placement   He has requested this and as a result I admitted the patient for assistance in nursing home placement   Patient lives with a roommate who does not desire to have home health come to their house  His daughter is also present in the room and can not take care of him from where she is in [place]  Patient has a result was admitted to the hospital primarily for social reasons.  I will need case management to get him to nursing home as quickly as possible.    03/07/2025   Staff present during examination : Obed Willis RN  Patient is postop day 2  Vital signs are stable afebrile   Patient is doing well progressing appropriately   Awaiting nursing home placement or skilled unit placement.  Patient is undergoing physical therapy psychiatry has been consulted.  Dressings are being changed.  Patient is unable to take care of  himself at home due to his roommate not allowing home health to come over to take care of him.  He is debilitated deconditioned as feelings of hopelessness and depression.  My intention will be to await case management placement of the patient    03/08/2025  Postop day 2  Staff present during examination : Anabelle Ocampo LPN  Patient's vital signs are stable afebrile  Wounds are clean and dry   Tolerating diet, awaiting placement in a nursing home or sniff unit   Nothing to add at this point    Patient reports that his niece we will be taking care of him at home  He will be discharged later today if that is true.  Condition is good   Disposition to home   Further workup with regard to nursing home placement we will be accomplished I suspect as outpatient maybe?    03/09/2025   Postop day 3   Staff present during examination : Anabelle Ocampo LPN  Patient is doing well vital signs are stable afebrile   White count 7 hemoglobin 11 hematocrit 32 platelet count was 280  Renal profile is unremarkable BUN is 14 with a creatinine of 0.63.  Overall the patient is doing well progressing appropriately and we will be discharged probably in the morning to his sister's place if LTAC, rehab, nursing home, home health sniff unit not available

## 2025-03-11 NOTE — PROGRESS NOTES
[facility]                     [id number] [address]    PATIENT NAME:       MICHAEL SHERIFF   YOB: 1950  CSN:                [id number]   MRN:                [id number]  ADMIT DATE:         03/06/2025 08:03:00  PHYSICIAN:          Maxime Galindo MD                            PROGRESS NOTE    DATE:      SUBJECTIVE:  The patient was visited in the room and the niece is present there.    Dr. Tima Montano is going to discharge the patient today.  I was thinking   of discharging the patient tomorrow, but since Dr. Tima Montano is the   attending, he has decided to discharge the patient today.  The pathology results   of the axillary lymph node dissection are still awaited.  The patient was   initially placed to the nursing home because of extreme weakness and unable to   take care of himself at home, but with the help of the Physical Therapy over the   weekend, patient has been walking well and niece said that she will take the   patient to her home and they will do physical therapy as an outpatient.  He was   admitted electively for excision of the lymph node in his left axilla, and Dr. Tima Montano operated it and it went uneventful.    OBJECTIVE:  VITAL SIGNS:  The patient's vitals are as follows; blood pressure   138/69, pulse 68, O2 sat 95%, temperature 97.8.    HEENT:  Head is normocephalic.  Pupils bilaterally reactive and equal in size.    Mild conjunctival pallor.  No scleral icterus.  Trachea is in midline.  CHEST:  Has good bilateral air entry.  CVS:  First and second heart sounds are heard normally without any murmurs.  ABDOMEN:  Soft, nontender.  EXTREMITIES:  Devoid of any edema, cyanosis or clubbing.  The axillary reason of   the left axilla is devoid of any bleeding or any signs of infection.    LABS AND INVESTIGATIONS:  White cell count is 6.84, hemoglobin 10.8, hematocrit   31.7,  platelet count is adequate.  Sodium 132, potassium 3.7.  Chemistry is from   9th.  There was no labs drawn today for the chemistry.    IMPRESSION:    1. Status post axillary lymph node dissection and excision, biopsy results   awaited.  2. Extreme generalized weakness and debility, getting better with physical   therapy.  The patient will go home with niece and stay with her, and get   physical therapy as an outpatient.  3. History of insomnia.  4. History of hypertension.  5. History of primary hypothyroidism.  6. History of gastroesophageal reflux disease.    PLAN:  Continue the present line of treatment.  Follow the surgical   recommendations.  The patient is being discharged to home.  He will do physical   therapy as an outpatient.  Followup will be done with me as well as with Dr. Tima Montano.    It was pleasure taking care of Mr. Rickey Navarro during his stay at [facility].  I thank Dr. Tima Montano in taking care of this   patient during his stay at [facility] on the 3rd floor.        ______________________________  Maxime Galindo MD    SS/ALYSAS  DD:  03/10/2025  Time:  06:55PM  DT:  03/10/2025  Time:  07:41PM  Job #:  [id number]      PROGRESS NOTE

## 2025-03-12 LAB — MAYO GENERIC ORDERABLE RESULT: NORMAL

## 2025-03-13 ENCOUNTER — PATIENT OUTREACH (OUTPATIENT)
Dept: ADMINISTRATIVE | Facility: CLINIC | Age: 75
End: 2025-03-13
Payer: MEDICARE

## 2025-03-13 NOTE — PROGRESS NOTES
C3 nurse spoke with Rickey Navarro's daughter, Abby, for a TCC post hospital discharge follow up call. The patient has a scheduled HOSFU appointment with Maxime Galindo MD (PCP) on 3/25/25, per daughter. He also completed a Post-Op follow up with Tima Montano MD (general surgery) on 3/11/25.

## 2025-03-13 NOTE — PROGRESS NOTES
C3 nurse attempted to contact patient. The following occurred:   C3 nurse attempted to contact Rickey Navarro's daughter, Abby, for a TCC post hospital discharge follow up call. The patient is unable to conduct the call @ this time. The patient requested a callback.

## 2025-04-03 LAB — PSYCHE PATHOLOGY RESULT: NORMAL

## (undated) DEVICE — Device

## (undated) DEVICE — GUIDEWIRE AMPLATZ .035X260

## (undated) DEVICE — DRAPE THREE-QUARTER 53X77IN

## (undated) DEVICE — SUT CTD VICRYL 3-0 CR/SH

## (undated) DEVICE — BANDAGE ROLL COTTN 4.5INX4.1YD

## (undated) DEVICE — SPIKE CONTRAST CONTROLLER

## (undated) DEVICE — GLOVE SENSICARE PI GRN 7

## (undated) DEVICE — KIT MICROINTRO 4F .018X40X7CM

## (undated) DEVICE — DRAPE SPLIT ADHESIVE 77X120IN

## (undated) DEVICE — SHEATH INTRODUCER 6FR 11CM

## (undated) DEVICE — DRAIN PENRS SIL STRL .25X18IN

## (undated) DEVICE — REPROCESSED CATH ACUNAV 8FR

## (undated) DEVICE — COVER DRAPE ACUSON STERILE

## (undated) DEVICE — CHLORAPREP W TINT 26ML APPL

## (undated) DEVICE — CLIP LIGATION MEDIUM CLIPS 1

## (undated) DEVICE — SHEATH BRITE TIP 9F 35CM

## (undated) DEVICE — OMNIPAQUE 350 200ML

## (undated) DEVICE — GLOVE SENSICARE NEOPRENE 6.5

## (undated) DEVICE — CATH WEDGE 6FR X 110CM

## (undated) DEVICE — BLLN SIZING AGA 34MM

## (undated) DEVICE — PROTECTION STATION PLUS

## (undated) DEVICE — GOWN ECLIPSE REINF LV4 TWL 2XL

## (undated) DEVICE — BNDG COFLEX FOAM LF2 ST 4X5YD

## (undated) DEVICE — CATH DXTERITY MPASHA 110CM 6FR

## (undated) DEVICE — SOL IRR SOD CHL .9% POUR

## (undated) DEVICE — GOWN POLY REINF BRTH SLV XL

## (undated) DEVICE — KIT CUSTOM MANIFOLD

## (undated) DEVICE — PENCIL SMOKE EVAC ROCKER 70MM

## (undated) DEVICE — SUPPORT ULNA NERVE PROTECTOR

## (undated) DEVICE — SEE MEDLINE ITEM 156894

## (undated) DEVICE — GUIDEWIRE EMERALD 150CM PTFE

## (undated) DEVICE — POSITIONER HEEL FOAM CONVOLTD

## (undated) DEVICE — GLOVE SENSICARE PI GRN 8.5

## (undated) DEVICE — GLOVE SIGNATURE ESSNTL LTX 6.5

## (undated) DEVICE — PAD ELECTROSURGICAL SPL W/CORD